# Patient Record
Sex: MALE | Race: WHITE | NOT HISPANIC OR LATINO | Employment: OTHER | URBAN - METROPOLITAN AREA
[De-identification: names, ages, dates, MRNs, and addresses within clinical notes are randomized per-mention and may not be internally consistent; named-entity substitution may affect disease eponyms.]

---

## 2017-07-31 ENCOUNTER — ALLSCRIPTS OFFICE VISIT (OUTPATIENT)
Dept: OTHER | Facility: OTHER | Age: 67
End: 2017-07-31

## 2017-07-31 LAB
BILIRUB UR QL STRIP: NORMAL
CLARITY UR: NORMAL
COLOR UR: YELLOW
GLUCOSE (HISTORICAL): NORMAL
HGB UR QL STRIP.AUTO: NORMAL
KETONES UR STRIP-MCNC: NORMAL MG/DL
LEUKOCYTE ESTERASE UR QL STRIP: NORMAL
NITRITE UR QL STRIP: NORMAL
PH UR STRIP.AUTO: 5.5 [PH]
PROT UR STRIP-MCNC: NORMAL MG/DL
SP GR UR STRIP.AUTO: 1.02
UROBILINOGEN UR QL STRIP.AUTO: 0.2

## 2017-12-22 DIAGNOSIS — N40.1 ENLARGED PROSTATE WITH LOWER URINARY TRACT SYMPTOMS (LUTS): ICD-10-CM

## 2018-01-14 VITALS
HEIGHT: 69 IN | SYSTOLIC BLOOD PRESSURE: 110 MMHG | DIASTOLIC BLOOD PRESSURE: 88 MMHG | WEIGHT: 200 LBS | BODY MASS INDEX: 29.62 KG/M2

## 2018-01-23 ENCOUNTER — ALLSCRIPTS OFFICE VISIT (OUTPATIENT)
Dept: OTHER | Facility: OTHER | Age: 68
End: 2018-01-23

## 2018-01-23 LAB
BILIRUB UR QL STRIP: NORMAL
CLARITY UR: NORMAL
COLOR UR: YELLOW
GLUCOSE (HISTORICAL): NORMAL
HGB UR QL STRIP.AUTO: NORMAL
KETONES UR STRIP-MCNC: NORMAL MG/DL
LEUKOCYTE ESTERASE UR QL STRIP: NORMAL
NITRITE UR QL STRIP: NORMAL
PH UR STRIP.AUTO: 6.5 [PH]
PROT UR STRIP-MCNC: NORMAL MG/DL
SP GR UR STRIP.AUTO: 1.02
UROBILINOGEN UR QL STRIP.AUTO: 0.2

## 2019-01-21 RX ORDER — LISINOPRIL 10 MG/1
10 TABLET ORAL DAILY
Refills: 1 | COMMUNITY
Start: 2018-12-16 | End: 2020-02-24 | Stop reason: ALTCHOICE

## 2019-01-21 RX ORDER — MULTIVITAMIN
TABLET ORAL
COMMUNITY

## 2019-01-21 RX ORDER — LATANOPROST 50 UG/ML
SOLUTION/ DROPS OPHTHALMIC
Refills: 5 | COMMUNITY
Start: 2019-01-07

## 2019-01-21 RX ORDER — METOPROLOL SUCCINATE 50 MG/1
TABLET, EXTENDED RELEASE ORAL
COMMUNITY
End: 2020-02-24 | Stop reason: SDUPTHER

## 2019-01-21 RX ORDER — TAMSULOSIN HYDROCHLORIDE 0.4 MG/1
0.4 CAPSULE ORAL DAILY
Refills: 3 | COMMUNITY
Start: 2018-11-29 | End: 2019-01-24 | Stop reason: SDUPTHER

## 2019-01-24 ENCOUNTER — OFFICE VISIT (OUTPATIENT)
Dept: UROLOGY | Facility: MEDICAL CENTER | Age: 69
End: 2019-01-24
Payer: COMMERCIAL

## 2019-01-24 VITALS
DIASTOLIC BLOOD PRESSURE: 90 MMHG | SYSTOLIC BLOOD PRESSURE: 138 MMHG | WEIGHT: 213 LBS | BODY MASS INDEX: 31.55 KG/M2 | HEART RATE: 90 BPM | HEIGHT: 69 IN

## 2019-01-24 DIAGNOSIS — Z12.5 ENCOUNTER FOR PROSTATE CANCER SCREENING: ICD-10-CM

## 2019-01-24 DIAGNOSIS — N13.8 BPH WITH OBSTRUCTION/LOWER URINARY TRACT SYMPTOMS: ICD-10-CM

## 2019-01-24 DIAGNOSIS — R31.29 MICROSCOPIC HEMATURIA: Primary | ICD-10-CM

## 2019-01-24 DIAGNOSIS — N40.1 BPH WITH OBSTRUCTION/LOWER URINARY TRACT SYMPTOMS: ICD-10-CM

## 2019-01-24 LAB
SL AMB  POCT GLUCOSE, UA: ABNORMAL
SL AMB LEUKOCYTE ESTERASE,UA: ABNORMAL
SL AMB POCT BILIRUBIN,UA: ABNORMAL
SL AMB POCT BLOOD,UA: ABNORMAL
SL AMB POCT CLARITY,UA: CLEAR
SL AMB POCT COLOR,UA: YELLOW
SL AMB POCT KETONES,UA: ABNORMAL
SL AMB POCT NITRITE,UA: ABNORMAL
SL AMB POCT PH,UA: 6
SL AMB POCT SPECIFIC GRAVITY,UA: 1.02
SL AMB POCT URINE PROTEIN: ABNORMAL
SL AMB POCT UROBILINOGEN: 0.2

## 2019-01-24 PROCEDURE — 81003 URINALYSIS AUTO W/O SCOPE: CPT | Performed by: UROLOGY

## 2019-01-24 PROCEDURE — 99214 OFFICE O/P EST MOD 30 MIN: CPT | Performed by: UROLOGY

## 2019-01-24 RX ORDER — SODIUM, POTASSIUM,MAG SULFATES 17.5-3.13G
SOLUTION, RECONSTITUTED, ORAL ORAL
Refills: 0 | COMMUNITY
Start: 2019-01-17 | End: 2020-02-24 | Stop reason: ALTCHOICE

## 2019-01-24 RX ORDER — TAMSULOSIN HYDROCHLORIDE 0.4 MG/1
0.4 CAPSULE ORAL 2 TIMES DAILY
Qty: 180 CAPSULE | Refills: 3 | Status: SHIPPED | OUTPATIENT
Start: 2019-01-24 | End: 2020-03-26

## 2019-01-24 RX ORDER — PREDNISONE 10 MG/1
TABLET ORAL
Refills: 0 | COMMUNITY
Start: 2019-01-14 | End: 2020-02-24 | Stop reason: ALTCHOICE

## 2019-01-24 RX ORDER — BILBERRY FRUIT 1000 MG
CAPSULE ORAL
COMMUNITY

## 2019-01-24 RX ORDER — BENZONATATE 100 MG/1
CAPSULE ORAL
Refills: 0 | COMMUNITY
Start: 2019-01-14 | End: 2020-02-24 | Stop reason: ALTCHOICE

## 2019-01-24 NOTE — LETTER
2019     Rito Mcneil, [address] [facility] [address] [place] [id number]    Patient: Ludivina Bunch   YOB: 1950   Date of Visit: 2019       Dear Dr Marybel Cox: Thank you for referring Josemanuel Gaspar to me for evaluation  Below are my notes for this consultation  If you have questions, please do not hesitate to call me  I look forward to following your patient along with you  Sincerely,        Beti Ortiz MD        CC: No Recipients  Beti Ortiz MD  2019 10:54 AM  Sign at close encounter  [address] [facility]  [address]  [employer], [address] [place] [phone]  www  [facility]  org      NAME: Ludivina Bunch  AGE: 71 y o  SEX: male  : 1950   MRN: [id number]    DATE: 2019  TIME: 10:37 AM    Assessment and Plan:    BPH with obstruction with increased urgency and some urge incontinence when it is very cold and he is very active-he would like to increase his medication and we will place him on Flomax 0 4 mg p  O  B i d   If this is not effective we will add Proscar to his regimen  Follow-up 1 year with PSA  Chief Complaint   No chief complaint on file  History of Present Illness   Yearly follow-up for BPH with obstruction, chronic microscopic hematuria:  Remains on saw palmetto and Flomax 1 daily  Cleopatra Evans PSA is 2 36 as of 2019, and it was 2 08 2018, 1 82 2016 1 53 2015  Today's urinalysis shows trace blood which he has had for years  He had cystoscopy for this about 2 years ago with Dr London Mcfarlane   On a day-to-day basis, he is doing okay except when he goes snowmobiling he has urgency and occasionally has urge incontinence if he cannot exposed himself quickly enough  He is concerned because he has a back packing trip coming up to [place] and he is afraid that he may wet himself if he is not able to retrieve his penis quickly enough    He has nocturia 2 times a night     The following portions of the patient's history were reviewed and updated as appropriate: allergies, current medications, past family history, past medical history, past social history, past surgical history and problem list     Review of Systems   Review of Systems   Genitourinary: Positive for urgency  Negative for difficulty urinating, discharge, dysuria, enuresis, frequency, genital sores, hematuria, penile pain, penile swelling, scrotal swelling and testicular pain  Active Problem List   There is no problem list on file for this patient  Objective   There were no vitals taken for this visit  Physical Exam   Constitutional: He is oriented to person, place, and time  He appears well-developed and well-nourished  HENT:   Head: Normocephalic and atraumatic  Eyes: EOM are normal    Neck: Normal range of motion  Pulmonary/Chest: Effort normal    Abdominal: Soft  He exhibits no distension and no mass  There is no tenderness  There is no rebound and no guarding  No inguinal hernia   Genitourinary: Rectum normal, prostate normal and penis normal    Genitourinary Comments: Rectal exam reveals normal tone, no masses and his prostate is about 40-50 g, smooth symmetric and benign  No nodules  Musculoskeletal: Normal range of motion  Neurological: He is alert and oriented to person, place, and time  Skin: Skin is warm and dry  Psychiatric: He has a normal mood and affect   His behavior is normal  Judgment and thought content normal            Current Medications     Current Outpatient Prescriptions:     benzonatate (TESSALON PERLES) 100 mg capsule, TAKE 1 CAPSULE BY MOUTH THREE TIMES A DAY AS NEEDED FOR COUGH, Disp: , Rfl: 0    latanoprost (XALATAN) 0 005 % ophthalmic solution, USE 1 DROP INTO BOTH EYES AT BEDTIME, Disp: , Rfl: 5    lisinopril (ZESTRIL) 10 mg tablet, Take 10 mg by mouth daily, Disp: , Rfl: 1    metoprolol succinate (TOPROL-XL) 50 mg 24 hr tablet, Take by mouth, Disp: , Rfl:     Misc Natural Products (SAW PALMETTO) CAPS, Take by mouth, Disp: , Rfl:     Multiple Vitamin (MULTI-VITAMIN DAILY) TABS, Take by mouth, Disp: , Rfl:     predniSONE 10 mg tablet, TAKE 1 TAB 3 TIMES DAILY FOR 5DAYS, 1 TAB TWICE DAILY FOR 5DAYS THEN 1TAB FOR 5DAYS, Disp: , Rfl: 0    Saw [place] 1000 MG CAPS, Take by mouth, Disp: , Rfl:     SUPREP BOWEL PREP KIT 17 5-3 13-1 6 GM/177ML SOLN, TAKE 177 MILLILITER AS DIRECTED, Disp: , Rfl: 0    tamsulosin (FLOMAX) 0 4 mg, Take 0 4 mg by mouth daily, Disp: , Rfl: 3        Nathalia Marsh MD

## 2019-01-24 NOTE — PROGRESS NOTES
[address] [facility]  [facility]  [address] [facility] [place]  [place], [address] [facility]  [phone]  www  [facility]  org      NAME: Getachew Gaona  AGE: 71 y o  SEX: male  : 1950   MRN: [id number]    DATE: 2019  TIME: 10:37 AM    Assessment and Plan:    BPH with obstruction with increased urgency and some urge incontinence when it is very cold and he is very active-he would like to increase his medication and we will place him on Flomax 0 4 mg p  O  B i d   If this is not effective we will add Proscar to his regimen  Follow-up 1 year with PSA  Chief Complaint   No chief complaint on file  History of Present Illness   Yearly follow-up for BPH with obstruction, chronic microscopic hematuria:  Remains on saw palmetto and Flomax 1 daily  Mariaa Lay PSA is 2 36 as of 2019, and it was 2 08 2018, 1 82 2016 1 53 2015  Today's urinalysis shows trace blood which he has had for years  He had cystoscopy for this about 2 years ago with Dr Meghan Abebe   On a day-to-day basis, he is doing okay except when he goes snowmobiling he has urgency and occasionally has urge incontinence if he cannot exposed himself quickly enough  He is concerned because he has a back packing trip coming up to [place] and he is afraid that he may wet himself if he is not able to retrieve his penis quickly enough  He has nocturia 2 times a night  The following portions of the patient's history were reviewed and updated as appropriate: allergies, current medications, past family history, past medical history, past social history, past surgical history and problem list     Review of Systems   Review of Systems   Genitourinary: Positive for urgency  Negative for difficulty urinating, discharge, dysuria, enuresis, frequency, genital sores, hematuria, penile pain, penile swelling, scrotal swelling and testicular pain         Active Problem List   There is no problem list on file for this patient  Objective   There were no vitals taken for this visit  Physical Exam   Constitutional: He is oriented to person, place, and time  He appears well-developed and well-nourished  HENT:   Head: Normocephalic and atraumatic  Eyes: EOM are normal    Neck: Normal range of motion  Pulmonary/Chest: Effort normal    Abdominal: Soft  He exhibits no distension and no mass  There is no tenderness  There is no rebound and no guarding  No inguinal hernia   Genitourinary: Rectum normal, prostate normal and penis normal    Genitourinary Comments: Rectal exam reveals normal tone, no masses and his prostate is about 40-50 g, smooth symmetric and benign  No nodules  Musculoskeletal: Normal range of motion  Neurological: He is alert and oriented to person, place, and time  Skin: Skin is warm and dry  Psychiatric: He has a normal mood and affect   His behavior is normal  Judgment and thought content normal            Current Medications     Current Outpatient Prescriptions:     benzonatate (TESSALON PERLES) 100 mg capsule, TAKE 1 CAPSULE BY MOUTH THREE TIMES A DAY AS NEEDED FOR COUGH, Disp: , Rfl: 0    latanoprost (XALATAN) 0 005 % ophthalmic solution, USE 1 DROP INTO BOTH EYES AT BEDTIME, Disp: , Rfl: 5    lisinopril (ZESTRIL) 10 mg tablet, Take 10 mg by mouth daily, Disp: , Rfl: 1    metoprolol succinate (TOPROL-XL) 50 mg 24 hr tablet, Take by mouth, Disp: , Rfl:     Misc Natural Products (SAW PALMETTO) CAPS, Take by mouth, Disp: , Rfl:     Multiple Vitamin (MULTI-VITAMIN DAILY) TABS, Take by mouth, Disp: , Rfl:     predniSONE 10 mg tablet, TAKE 1 TAB 3 TIMES DAILY FOR 5DAYS, 1 TAB TWICE DAILY FOR 5DAYS THEN 1TAB FOR 5DAYS, Disp: , Rfl: 0    Saw [place] 1000 MG CAPS, Take by mouth, Disp: , Rfl:     SUPREP BOWEL PREP KIT 17 5-3 13-1 6 GM/177ML SOLN, TAKE 177 MILLILITER AS DIRECTED, Disp: , Rfl: 0    tamsulosin (FLOMAX) 0 4 mg, Take 0 4 mg by mouth daily, Disp: , Rfl: [address] [place] [address], MD

## 2020-02-24 ENCOUNTER — OFFICE VISIT (OUTPATIENT)
Dept: UROLOGY | Facility: MEDICAL CENTER | Age: 70
End: 2020-02-24
Payer: COMMERCIAL

## 2020-02-24 VITALS
BODY MASS INDEX: 31.84 KG/M2 | DIASTOLIC BLOOD PRESSURE: 80 MMHG | HEART RATE: 98 BPM | SYSTOLIC BLOOD PRESSURE: 118 MMHG | WEIGHT: 215 LBS | HEIGHT: 69 IN

## 2020-02-24 DIAGNOSIS — R97.20 RISING PSA LEVEL: ICD-10-CM

## 2020-02-24 DIAGNOSIS — N40.1 BPH WITH OBSTRUCTION/LOWER URINARY TRACT SYMPTOMS: Primary | ICD-10-CM

## 2020-02-24 DIAGNOSIS — N13.8 BPH WITH OBSTRUCTION/LOWER URINARY TRACT SYMPTOMS: Primary | ICD-10-CM

## 2020-02-24 LAB
SL AMB  POCT GLUCOSE, UA: ABNORMAL
SL AMB LEUKOCYTE ESTERASE,UA: ABNORMAL
SL AMB POCT BILIRUBIN,UA: ABNORMAL
SL AMB POCT BLOOD,UA: ABNORMAL
SL AMB POCT CLARITY,UA: ABNORMAL
SL AMB POCT COLOR,UA: YELLOW
SL AMB POCT KETONES,UA: ABNORMAL
SL AMB POCT NITRITE,UA: ABNORMAL
SL AMB POCT PH,UA: 5.5
SL AMB POCT SPECIFIC GRAVITY,UA: >=1.03
SL AMB POCT URINE PROTEIN: ABNORMAL
SL AMB POCT UROBILINOGEN: 0.2

## 2020-02-24 PROCEDURE — 81003 URINALYSIS AUTO W/O SCOPE: CPT | Performed by: UROLOGY

## 2020-02-24 PROCEDURE — 99214 OFFICE O/P EST MOD 30 MIN: CPT | Performed by: UROLOGY

## 2020-02-24 RX ORDER — METOPROLOL TARTRATE 50 MG/1
TABLET, FILM COATED ORAL
COMMUNITY
Start: 2019-03-07

## 2020-02-24 RX ORDER — AMLODIPINE BESYLATE 2.5 MG/1
2.5 TABLET ORAL DAILY
COMMUNITY
Start: 2019-12-27 | End: 2022-06-20

## 2020-02-24 RX ORDER — ALBUTEROL SULFATE 90 UG/1
2 AEROSOL, METERED RESPIRATORY (INHALATION) EVERY 6 HOURS PRN
COMMUNITY
Start: 2020-02-13 | End: 2021-02-12

## 2020-02-24 NOTE — PROGRESS NOTES
[address] [facility]  [facility]  [address] [facility]  [place], [address] [facility]  [phone]  www  [facility]  org      NAME: Shawn Abdi  AGE: 79 y o  SEX: male  : 1950   MRN: [id number]    DATE: 2020  TIME: 10:48 AM    Assessment and Plan:    Rising PSA:  Normal rectal exam, and I am concerned about the rise in the PSA we discussed doing a biopsy now versus doing in 6 months  I think the best check it again in 6 months to see if it drops and if it does not we will set him up for transperineal prostate biopsy  BPH with obstruction with lower urinary tract symptoms:  Overall he is doing very well with this and continue with the tamsulosin  Also the saw palmetto  Chief Complaint   No chief complaint on file  History of Present Illness   Yearly follow-up for BPH with obstruction, chronic microscopic hematuria:  Remains on saw palmetto and Flomax 1 daily  Irl Pirosia PSA is 3 5 to as of 2020 2 36 as of 2019, and it was 2 08 2018, 1 82 2016 1 53 2015  Today's urinalysis shows trace blood which he has had for years  He had cystoscopy for this about 2 years ago with Dr Tamika Saleh   Voiding well on the Flomax b i d  And saw palmetto  He voids frequently but he drinks a fair amount water  Nocturia 2 times a night  The following portions of the patient's history were reviewed and updated as appropriate: allergies, current medications, past family history, past medical history, past social history, past surgical history and problem list   Past Medical History:   Diagnosis Date    Arthritis     BPH with obstruction/lower urinary tract symptoms     Enlarged prostate     Erectile dysfunction     Feeling of incomplete bladder emptying     Microscopic hematuria     Poor urinary stream     Urgency of urination      No past surgical history on file    shoulder  Review of Systems   Review of Systems   Genitourinary: Positive for frequency  Negative for difficulty urinating and hematuria  Active Problem List   There is no problem list on file for this patient  Objective   /80   Pulse 98   Ht 5' 9" (1 753 m)   Wt 97 5 kg (215 lb)   BMI 31 75 kg/m²     Physical Exam   Constitutional: He is oriented to person, place, and time  He appears well-developed and well-nourished  HENT:   Head: Normocephalic and atraumatic  Eyes: EOM are normal    Neck: Normal range of motion  Pulmonary/Chest: Effort normal    Genitourinary: Rectum normal and prostate normal    Genitourinary Comments: Rectal exam reveals normal tone, no masses and his prostate is about 40-50 g, smooth symmetric and benign  No nodules  Musculoskeletal: Normal range of motion  Neurological: He is alert and oriented to person, place, and time  Skin: Skin is warm and dry  Psychiatric: He has a normal mood and affect   His behavior is normal  Judgment and thought content normal            Current Medications     Current Outpatient Medications:     albuterol (PROVENTIL HFA,VENTOLIN HFA) 90 mcg/act inhaler, Inhale 2 puffs every 6 (six) hours as needed, Disp: , Rfl:     amLODIPine (NORVASC) 2 5 mg tablet, Take 2 5 mg by mouth daily, Disp: , Rfl:     latanoprost (XALATAN) 0 005 % ophthalmic solution, USE 1 DROP INTO BOTH EYES AT BEDTIME, Disp: , Rfl: 5    metoprolol tartrate (LOPRESSOR) 50 mg tablet, TAKE 1/2 TABLET (25MG) BY MOUTH TWICE DAILY, Disp: , Rfl:     Multiple Vitamin (MULTI-VITAMIN DAILY) TABS, Take by mouth, Disp: , Rfl:     Saw [place] 1000 MG CAPS, Take by mouth, Disp: , Rfl:     tamsulosin (FLOMAX) 0 4 mg, Take 1 capsule (0 4 mg total) by mouth 2 (two) times a day, Disp: 180 capsule, Rfl: 3        Miguel Angel Mcintosh MD

## 2020-03-26 DIAGNOSIS — N40.1 BPH WITH OBSTRUCTION/LOWER URINARY TRACT SYMPTOMS: ICD-10-CM

## 2020-03-26 DIAGNOSIS — N13.8 BPH WITH OBSTRUCTION/LOWER URINARY TRACT SYMPTOMS: ICD-10-CM

## 2020-03-26 RX ORDER — TAMSULOSIN HYDROCHLORIDE 0.4 MG/1
0.4 CAPSULE ORAL 2 TIMES DAILY
Qty: 180 CAPSULE | Refills: 3 | Status: SHIPPED | OUTPATIENT
Start: 2020-03-26 | End: 2021-04-27

## 2020-08-24 ENCOUNTER — OFFICE VISIT (OUTPATIENT)
Dept: UROLOGY | Facility: MEDICAL CENTER | Age: 70
End: 2020-08-24
Payer: COMMERCIAL

## 2020-08-24 VITALS
TEMPERATURE: 97.5 F | SYSTOLIC BLOOD PRESSURE: 114 MMHG | BODY MASS INDEX: 30.96 KG/M2 | HEIGHT: 69 IN | DIASTOLIC BLOOD PRESSURE: 70 MMHG | WEIGHT: 209 LBS

## 2020-08-24 DIAGNOSIS — Z01.810 PREOP CARDIOVASCULAR EXAM: ICD-10-CM

## 2020-08-24 DIAGNOSIS — N40.1 BPH WITH OBSTRUCTION/LOWER URINARY TRACT SYMPTOMS: Primary | ICD-10-CM

## 2020-08-24 DIAGNOSIS — R97.20 RISING PSA LEVEL: ICD-10-CM

## 2020-08-24 DIAGNOSIS — N13.8 BPH WITH OBSTRUCTION/LOWER URINARY TRACT SYMPTOMS: Primary | ICD-10-CM

## 2020-08-24 LAB
SL AMB  POCT GLUCOSE, UA: NORMAL
SL AMB LEUKOCYTE ESTERASE,UA: NORMAL
SL AMB POCT BILIRUBIN,UA: NORMAL
SL AMB POCT BLOOD,UA: NORMAL
SL AMB POCT CLARITY,UA: CLEAR
SL AMB POCT COLOR,UA: YELLOW
SL AMB POCT KETONES,UA: NORMAL
SL AMB POCT NITRITE,UA: NORMAL
SL AMB POCT PH,UA: 6.5
SL AMB POCT SPECIFIC GRAVITY,UA: 1.02
SL AMB POCT URINE PROTEIN: NORMAL
SL AMB POCT UROBILINOGEN: 0.2

## 2020-08-24 PROCEDURE — 81003 URINALYSIS AUTO W/O SCOPE: CPT | Performed by: UROLOGY

## 2020-08-24 PROCEDURE — 99214 OFFICE O/P EST MOD 30 MIN: CPT | Performed by: UROLOGY

## 2020-08-24 RX ORDER — SILDENAFIL 100 MG/1
100 TABLET, FILM COATED ORAL AS NEEDED
COMMUNITY
Start: 2020-07-07 | End: 2022-06-20 | Stop reason: SDUPTHER

## 2020-08-24 NOTE — H&P
[address] [facility]  [facility]  [address] [facility]  [place], [address] [facility]  [phone]  www  [facility]  org      NAME: Capri Salas  AGE: 79 y o  SEX: male  : 1950   MRN: [id number]    DATE: 2020  TIME: 10:32 AM    Assessment and Plan:    Rising PSA,   And this is concerning for malignancy  We discussed performing prostate biopsy, and I recommend this  This will be a transperineal prostate biopsy in the technique as well as the risks of bleeding infection and urinary retention were explained he gives informed consent  Chief Complaint   No chief complaint on file  History of Present Illness   Follow-up rising PSA:  Last office visit we discussed performing a biopsy then verses doing in 6 months  PSA is now up to 3 98, PSA was up to 3 5 as of 2020, and this was showing a steady rise from 2 36 the year before, and 2 08 and 1 82 and 1 53  He has chronic microscopic hematuria and he underwent cystoscopy 3 years ago for this  He is on Flomax b i d         The following portions of the patient's history were reviewed and updated as appropriate: allergies, current medications, past family history, past medical history, past social history, past surgical history and problem list   Past Medical History:   Diagnosis Date    Arthritis     BPH with obstruction/lower urinary tract symptoms     Enlarged prostate     Erectile dysfunction     Feeling of incomplete bladder emptying     Microscopic hematuria     Poor urinary stream     Urgency of urination      History reviewed  No pertinent surgical history  shoulder  Review of Systems   Review of Systems   Constitutional: Negative for fever  Respiratory: Negative  Cardiovascular: Negative  Genitourinary: Negative  Active Problem List   There is no problem list on file for this patient        Objective   /70   Temp 97 5 °F (36 4 °C)   Ht 5' 9" (1 753 m)   Wt 94 8 kg (209 lb)   BMI 30 86 kg/m²     Physical Exam  Constitutional:       Appearance: Normal appearance  He is normal weight  HENT:      Head: Normocephalic and atraumatic  Eyes:      Extraocular Movements: Extraocular movements intact  Neck:      Musculoskeletal: Normal range of motion  Cardiovascular:      Rate and Rhythm: Normal rate and regular rhythm  Heart sounds: Normal heart sounds  Pulmonary:      Effort: Pulmonary effort is normal  No respiratory distress  Breath sounds: Normal breath sounds  No stridor  No wheezing or rhonchi  Abdominal:      General: There is no distension  Palpations: Abdomen is soft  Tenderness: There is no abdominal tenderness  Musculoskeletal: Normal range of motion  Neurological:      General: No focal deficit present  Mental Status: He is alert and oriented to person, place, and time  Psychiatric:         Mood and Affect: Mood normal          Behavior: Behavior normal          Thought Content:  Thought content normal          Judgment: Judgment normal              Current Medications     Current Outpatient Medications:     albuterol (PROVENTIL HFA,VENTOLIN HFA) 90 mcg/act inhaler, Inhale 2 puffs every 6 (six) hours as needed, Disp: , Rfl:     latanoprost (XALATAN) 0 005 % ophthalmic solution, USE 1 DROP INTO BOTH EYES AT BEDTIME, Disp: , Rfl: 5    metoprolol tartrate (LOPRESSOR) 50 mg tablet, TAKE 1/2 TABLET (25MG) BY MOUTH TWICE DAILY, Disp: , Rfl:     Multiple Vitamin (MULTI-VITAMIN DAILY) TABS, Take by mouth, Disp: , Rfl:     Saw [place] 1000 MG CAPS, Take by mouth, Disp: , Rfl:     sildenafil (VIAGRA) 100 mg tablet, Take 100 mg by mouth as needed , Disp: , Rfl:     tamsulosin (FLOMAX) 0 4 mg, TAKE 1 CAPSULE (0 4 MG TOTAL) BY MOUTH 2 (TWO) TIMES A DAY, Disp: 180 capsule, Rfl: 3    amLODIPine (NORVASC) 2 5 mg tablet, Take 2 5 mg by mouth daily, Disp: , Rfl:         Brando Yost MD

## 2020-08-24 NOTE — PROGRESS NOTES
[address] [facility]  [facility]  [address] [facility], [address] [place] [address]  [phone]  www  [facility]  org      NAME: Jamal Marcano  AGE: 79 y o  SEX: male  : 1950   MRN: [id number]    DATE: 2020  TIME: 10:32 AM    Assessment and Plan:    Rising PSA,   And this is concerning for malignancy  We discussed performing prostate biopsy, and I recommend this  This will be a transperineal prostate biopsy in the technique as well as the risks of bleeding infection and urinary retention were explained he gives informed consent  Chief Complaint   No chief complaint on file  History of Present Illness   Follow-up rising PSA:  Last office visit we discussed performing a biopsy then verses doing in 6 months  PSA is now up to 3 98, PSA was up to 3 5 as of 2020, and this was showing a steady rise from 2 36 the year before, and 2 08 and 1 82 and 1 53  He has chronic microscopic hematuria and he underwent cystoscopy 3 years ago for this  He is on Flomax b i d         The following portions of the patient's history were reviewed and updated as appropriate: allergies, current medications, past family history, past medical history, past social history, past surgical history and problem list   Past Medical History:   Diagnosis Date    Arthritis     BPH with obstruction/lower urinary tract symptoms     Enlarged prostate     Erectile dysfunction     Feeling of incomplete bladder emptying     Microscopic hematuria     Poor urinary stream     Urgency of urination      History reviewed  No pertinent surgical history  shoulder  Review of Systems   Review of Systems   Constitutional: Negative for fever  Respiratory: Negative  Cardiovascular: Negative  Genitourinary: Negative  Active Problem List   There is no problem list on file for this patient        Objective   /70   Temp 97 5 °F (36 4 °C)   Ht 5' 9" (1 753 m)   Wt 94 8 kg (209 lb)   BMI 30 86 kg/m²     Physical Exam  Constitutional:       Appearance: Normal appearance  He is normal weight  HENT:      Head: Normocephalic and atraumatic  Eyes:      Extraocular Movements: Extraocular movements intact  Neck:      Musculoskeletal: Normal range of motion  Cardiovascular:      Rate and Rhythm: Normal rate and regular rhythm  Heart sounds: Normal heart sounds  Pulmonary:      Effort: Pulmonary effort is normal  No respiratory distress  Breath sounds: Normal breath sounds  No stridor  No wheezing or rhonchi  Abdominal:      General: There is no distension  Palpations: Abdomen is soft  Tenderness: There is no abdominal tenderness  Musculoskeletal: Normal range of motion  Neurological:      General: No focal deficit present  Mental Status: He is alert and oriented to person, place, and time  Psychiatric:         Mood and Affect: Mood normal          Behavior: Behavior normal          Thought Content:  Thought content normal          Judgment: Judgment normal              Current Medications     Current Outpatient Medications:     albuterol (PROVENTIL HFA,VENTOLIN HFA) 90 mcg/act inhaler, Inhale 2 puffs every 6 (six) hours as needed, Disp: , Rfl:     latanoprost (XALATAN) 0 005 % ophthalmic solution, USE 1 DROP INTO BOTH EYES AT BEDTIME, Disp: , Rfl: 5    metoprolol tartrate (LOPRESSOR) 50 mg tablet, TAKE 1/2 TABLET (25MG) BY MOUTH TWICE DAILY, Disp: , Rfl:     Multiple Vitamin (MULTI-VITAMIN DAILY) TABS, Take by mouth, Disp: , Rfl:     Saw [place] 1000 MG CAPS, Take by mouth, Disp: , Rfl:     sildenafil (VIAGRA) 100 mg tablet, Take 100 mg by mouth as needed , Disp: , Rfl:     tamsulosin (FLOMAX) 0 4 mg, TAKE 1 CAPSULE (0 4 MG TOTAL) BY MOUTH 2 (TWO) TIMES A DAY, Disp: 180 capsule, Rfl: 3    amLODIPine (NORVASC) 2 5 mg tablet, Take 2 5 mg by mouth daily, Disp: , Rfl:         Angelic Ashley MD

## 2020-08-26 ENCOUNTER — TELEPHONE (OUTPATIENT)
Dept: UROLOGY | Facility: MEDICAL CENTER | Age: 70
End: 2020-08-26

## 2020-08-26 NOTE — TELEPHONE ENCOUNTER
I left a voice mail message for the patient to schedule his procedure for 10/13/2020 at the [address] [place] [address] Way with Dr Salgado [place]     -instructions Emailed  -CBC, CMP 7-10 days prior  -[facility] -

## 2020-10-05 ENCOUNTER — APPOINTMENT (OUTPATIENT)
Dept: LAB | Facility: CLINIC | Age: 70
End: 2020-10-05
Payer: COMMERCIAL

## 2020-10-05 DIAGNOSIS — R97.20 RISING PSA LEVEL: ICD-10-CM

## 2020-10-05 LAB
ALBUMIN SERPL BCP-MCNC: 3.8 G/DL (ref 3.5–5)
ALP SERPL-CCNC: 94 U/L (ref 46–116)
ALT SERPL W P-5'-P-CCNC: 37 U/L (ref 12–78)
ANION GAP SERPL CALCULATED.3IONS-SCNC: 4 MMOL/L (ref 4–13)
AST SERPL W P-5'-P-CCNC: 24 U/L (ref 5–45)
BASOPHILS # BLD AUTO: 0.03 THOUSANDS/ΜL (ref 0–0.1)
BASOPHILS NFR BLD AUTO: 1 % (ref 0–1)
BILIRUB SERPL-MCNC: 0.6 MG/DL (ref 0.2–1)
BUN SERPL-MCNC: 19 MG/DL (ref 5–25)
CALCIUM SERPL-MCNC: 9.4 MG/DL (ref 8.3–10.1)
CHLORIDE SERPL-SCNC: 110 MMOL/L (ref 100–108)
CO2 SERPL-SCNC: 29 MMOL/L (ref 21–32)
CREAT SERPL-MCNC: 0.81 MG/DL (ref 0.6–1.3)
EOSINOPHIL # BLD AUTO: 0.12 THOUSAND/ΜL (ref 0–0.61)
EOSINOPHIL NFR BLD AUTO: 2 % (ref 0–6)
ERYTHROCYTE [DISTWIDTH] IN BLOOD BY AUTOMATED COUNT: 12.9 % (ref 11.6–15.1)
GFR SERPL CREATININE-BSD FRML MDRD: 90 ML/MIN/1.73SQ M
GLUCOSE P FAST SERPL-MCNC: 109 MG/DL (ref 65–99)
HCT VFR BLD AUTO: 47.6 % (ref 36.5–49.3)
HGB BLD-MCNC: 16.2 G/DL (ref 12–17)
IMM GRANULOCYTES # BLD AUTO: 0.02 THOUSAND/UL (ref 0–0.2)
IMM GRANULOCYTES NFR BLD AUTO: 0 % (ref 0–2)
LYMPHOCYTES # BLD AUTO: 1 THOUSANDS/ΜL (ref 0.6–4.47)
LYMPHOCYTES NFR BLD AUTO: 17 % (ref 14–44)
MCH RBC QN AUTO: 32.4 PG (ref 26.8–34.3)
MCHC RBC AUTO-ENTMCNC: 34 G/DL (ref 31.4–37.4)
MCV RBC AUTO: 95 FL (ref 82–98)
MONOCYTES # BLD AUTO: 0.56 THOUSAND/ΜL (ref 0.17–1.22)
MONOCYTES NFR BLD AUTO: 10 % (ref 4–12)
NEUTROPHILS # BLD AUTO: 4.04 THOUSANDS/ΜL (ref 1.85–7.62)
NEUTS SEG NFR BLD AUTO: 70 % (ref 43–75)
NRBC BLD AUTO-RTO: 0 /100 WBCS
PLATELET # BLD AUTO: 167 THOUSANDS/UL (ref 149–390)
PMV BLD AUTO: 11.7 FL (ref 8.9–12.7)
POTASSIUM SERPL-SCNC: 4.2 MMOL/L (ref 3.5–5.3)
PROT SERPL-MCNC: 7.6 G/DL (ref 6.4–8.2)
PSA SERPL-MCNC: 2.9 NG/ML (ref 0–4)
RBC # BLD AUTO: 5 MILLION/UL (ref 3.88–5.62)
SODIUM SERPL-SCNC: 143 MMOL/L (ref 136–145)
WBC # BLD AUTO: 5.77 THOUSAND/UL (ref 4.31–10.16)

## 2020-10-05 PROCEDURE — 36415 COLL VENOUS BLD VENIPUNCTURE: CPT

## 2020-10-05 PROCEDURE — 80053 COMPREHEN METABOLIC PANEL: CPT

## 2020-10-05 PROCEDURE — 84153 ASSAY OF PSA TOTAL: CPT

## 2020-10-05 PROCEDURE — 85025 COMPLETE CBC W/AUTO DIFF WBC: CPT

## 2020-10-12 ENCOUNTER — ANESTHESIA EVENT (OUTPATIENT)
Dept: GASTROENTEROLOGY | Facility: HOSPITAL | Age: 70
End: 2020-10-12
Payer: COMMERCIAL

## 2020-10-12 RX ORDER — SODIUM CHLORIDE 9 MG/ML
125 INJECTION, SOLUTION INTRAVENOUS CONTINUOUS
Status: CANCELLED | OUTPATIENT
Start: 2020-10-12

## 2020-10-13 ENCOUNTER — ANESTHESIA (OUTPATIENT)
Dept: GASTROENTEROLOGY | Facility: HOSPITAL | Age: 70
End: 2020-10-13
Payer: COMMERCIAL

## 2020-10-13 ENCOUNTER — TELEPHONE (OUTPATIENT)
Dept: UROLOGY | Facility: CLINIC | Age: 70
End: 2020-10-13

## 2020-10-13 ENCOUNTER — HOSPITAL ENCOUNTER (OUTPATIENT)
Facility: HOSPITAL | Age: 70
Setting detail: OUTPATIENT SURGERY
Discharge: HOME | End: 2020-10-13
Attending: UROLOGY | Admitting: UROLOGY
Payer: COMMERCIAL

## 2020-10-13 VITALS
DIASTOLIC BLOOD PRESSURE: 55 MMHG | HEIGHT: 69 IN | WEIGHT: 205 LBS | TEMPERATURE: 98 F | RESPIRATION RATE: 18 BRPM | HEART RATE: 69 BPM | SYSTOLIC BLOOD PRESSURE: 95 MMHG | BODY MASS INDEX: 30.36 KG/M2 | OXYGEN SATURATION: 94 %

## 2020-10-13 VITALS — HEART RATE: 74 BPM

## 2020-10-13 DIAGNOSIS — R97.20 RISING PSA LEVEL: ICD-10-CM

## 2020-10-13 PROBLEM — E66.9 OBESITY: Status: ACTIVE | Noted: 2020-10-13

## 2020-10-13 PROBLEM — N42.9 PROSTATIC DISORDER: Chronic | Status: ACTIVE | Noted: 2020-10-13

## 2020-10-13 PROBLEM — J45.909 ASTHMA: Chronic | Status: ACTIVE | Noted: 2020-10-13

## 2020-10-13 PROBLEM — I10 HTN (HYPERTENSION): Chronic | Status: ACTIVE | Noted: 2020-10-13

## 2020-10-13 PROCEDURE — G0416 PROSTATE BIOPSY, ANY MTHD: HCPCS | Performed by: PATHOLOGY

## 2020-10-13 PROCEDURE — 88344 IMHCHEM/IMCYTCHM EA MLT ANTB: CPT | Performed by: PATHOLOGY

## 2020-10-13 PROCEDURE — 55700 PR BIOPSY OF PROSTATE,NEEDLE/PUNCH: CPT | Performed by: UROLOGY

## 2020-10-13 PROCEDURE — NC001 PR NO CHARGE: Performed by: UROLOGY

## 2020-10-13 RX ORDER — FENTANYL CITRATE 50 UG/ML
INJECTION, SOLUTION INTRAMUSCULAR; INTRAVENOUS AS NEEDED
Status: DISCONTINUED | OUTPATIENT
Start: 2020-10-13 | End: 2020-10-13

## 2020-10-13 RX ORDER — TRAMADOL HYDROCHLORIDE 50 MG/1
50 TABLET ORAL EVERY 6 HOURS PRN
Status: DISCONTINUED | OUTPATIENT
Start: 2020-10-13 | End: 2020-10-13 | Stop reason: HOSPADM

## 2020-10-13 RX ORDER — TRAMADOL HYDROCHLORIDE 50 MG/1
50 TABLET ORAL EVERY 6 HOURS PRN
Qty: 5 TABLET | Refills: 0 | Status: SHIPPED | OUTPATIENT
Start: 2020-10-13

## 2020-10-13 RX ORDER — METOPROLOL TARTRATE 50 MG/1
50 TABLET, FILM COATED ORAL ONCE
Status: COMPLETED | OUTPATIENT
Start: 2020-10-13 | End: 2020-10-13

## 2020-10-13 RX ORDER — LIDOCAINE HYDROCHLORIDE 10 MG/ML
INJECTION, SOLUTION EPIDURAL; INFILTRATION; INTRACAUDAL; PERINEURAL AS NEEDED
Status: DISCONTINUED | OUTPATIENT
Start: 2020-10-13 | End: 2020-10-13 | Stop reason: HOSPADM

## 2020-10-13 RX ORDER — SODIUM CHLORIDE 9 MG/ML
INJECTION, SOLUTION INTRAVENOUS CONTINUOUS PRN
Status: DISCONTINUED | OUTPATIENT
Start: 2020-10-13 | End: 2020-10-13

## 2020-10-13 RX ORDER — LIDOCAINE HYDROCHLORIDE 10 MG/ML
INJECTION, SOLUTION EPIDURAL; INFILTRATION; INTRACAUDAL; PERINEURAL AS NEEDED
Status: DISCONTINUED | OUTPATIENT
Start: 2020-10-13 | End: 2020-10-13

## 2020-10-13 RX ORDER — PROPOFOL 10 MG/ML
INJECTION, EMULSION INTRAVENOUS AS NEEDED
Status: DISCONTINUED | OUTPATIENT
Start: 2020-10-13 | End: 2020-10-13

## 2020-10-13 RX ADMIN — PROPOFOL 50 MG: 10 INJECTION, EMULSION INTRAVENOUS at 09:24

## 2020-10-13 RX ADMIN — SODIUM CHLORIDE: 0.9 INJECTION, SOLUTION INTRAVENOUS at 09:07

## 2020-10-13 RX ADMIN — Medication 1000 MG: at 08:48

## 2020-10-13 RX ADMIN — LIDOCAINE HYDROCHLORIDE 50 MG: 10 INJECTION, SOLUTION EPIDURAL; INFILTRATION; INTRACAUDAL; PERINEURAL at 09:10

## 2020-10-13 RX ADMIN — PROPOFOL 100 MG: 10 INJECTION, EMULSION INTRAVENOUS at 09:10

## 2020-10-13 RX ADMIN — FENTANYL CITRATE 25 MCG: 50 INJECTION, SOLUTION INTRAMUSCULAR; INTRAVENOUS at 09:10

## 2020-10-13 RX ADMIN — FENTANYL CITRATE 25 MCG: 50 INJECTION, SOLUTION INTRAMUSCULAR; INTRAVENOUS at 09:29

## 2020-10-13 RX ADMIN — PROPOFOL 50 MG: 10 INJECTION, EMULSION INTRAVENOUS at 09:16

## 2020-10-13 RX ADMIN — FENTANYL CITRATE 25 MCG: 50 INJECTION, SOLUTION INTRAMUSCULAR; INTRAVENOUS at 09:31

## 2020-10-13 RX ADMIN — METOPROLOL TARTRATE 50 MG: 50 TABLET, FILM COATED ORAL at 09:03

## 2020-10-13 RX ADMIN — PROPOFOL 50 MG: 10 INJECTION, EMULSION INTRAVENOUS at 09:29

## 2020-10-13 RX ADMIN — FENTANYL CITRATE 25 MCG: 50 INJECTION, SOLUTION INTRAMUSCULAR; INTRAVENOUS at 09:07

## 2020-10-13 RX ADMIN — PROPOFOL 50 MG: 10 INJECTION, EMULSION INTRAVENOUS at 09:20

## 2020-10-20 DIAGNOSIS — R97.20 RISING PSA LEVEL: Primary | ICD-10-CM

## 2021-04-17 ENCOUNTER — APPOINTMENT (OUTPATIENT)
Dept: LAB | Facility: MEDICAL CENTER | Age: 71
End: 2021-04-17
Attending: UROLOGY
Payer: COMMERCIAL

## 2021-04-17 DIAGNOSIS — R97.20 RISING PSA LEVEL: ICD-10-CM

## 2021-04-17 LAB — PSA SERPL-MCNC: 3.8 NG/ML (ref 0–4)

## 2021-04-17 PROCEDURE — 84153 ASSAY OF PSA TOTAL: CPT

## 2021-04-17 PROCEDURE — 36415 COLL VENOUS BLD VENIPUNCTURE: CPT

## 2021-04-19 RX ORDER — ALBUTEROL SULFATE 90 UG/1
AEROSOL, METERED RESPIRATORY (INHALATION)
COMMUNITY
Start: 2021-04-07

## 2021-04-26 ENCOUNTER — OFFICE VISIT (OUTPATIENT)
Dept: UROLOGY | Facility: MEDICAL CENTER | Age: 71
End: 2021-04-26
Payer: COMMERCIAL

## 2021-04-26 VITALS — BODY MASS INDEX: 29.62 KG/M2 | HEIGHT: 69 IN | WEIGHT: 200 LBS

## 2021-04-26 DIAGNOSIS — R97.20 ELEVATED PSA: ICD-10-CM

## 2021-04-26 DIAGNOSIS — N13.8 BPH WITH OBSTRUCTION/LOWER URINARY TRACT SYMPTOMS: Primary | ICD-10-CM

## 2021-04-26 DIAGNOSIS — N40.1 BPH WITH OBSTRUCTION/LOWER URINARY TRACT SYMPTOMS: Primary | ICD-10-CM

## 2021-04-26 LAB
SL AMB  POCT GLUCOSE, UA: NORMAL
SL AMB LEUKOCYTE ESTERASE,UA: NORMAL
SL AMB POCT BILIRUBIN,UA: NORMAL
SL AMB POCT BLOOD,UA: NORMAL
SL AMB POCT CLARITY,UA: CLEAR
SL AMB POCT COLOR,UA: YELLOW
SL AMB POCT KETONES,UA: NORMAL
SL AMB POCT NITRITE,UA: NORMAL
SL AMB POCT PH,UA: 7
SL AMB POCT SPECIFIC GRAVITY,UA: 1.02
SL AMB POCT URINE PROTEIN: NORMAL
SL AMB POCT UROBILINOGEN: 0.2

## 2021-04-26 PROCEDURE — 99214 OFFICE O/P EST MOD 30 MIN: CPT | Performed by: PHYSICIAN ASSISTANT

## 2021-04-26 PROCEDURE — 81003 URINALYSIS AUTO W/O SCOPE: CPT | Performed by: PHYSICIAN ASSISTANT

## 2021-04-26 NOTE — PROGRESS NOTES
4/26/2021      Chief Complaint   Patient presents with    Elevated PSA    Benign Prostatic Hypertrophy       Assessment and Plan    70 y o  male managed by Dr Breanne Ramsey    1  Elevated PSA  · S/p transperineal prostate bx on 10/13/2020 with Dr Celeste Blanc that was benign  · PSA: 3 8 (4/17/21)  · Previous PSAs: 2 9 (10/5/2020), 3 98 (8/19/2020), 3 52 (2/13/2020), 1/14/19)  · KENZIE today revealed smooth, enlarged prostate, without appreciable nodule, induration or asymmetry  · Repeat PSA in 6 months     2  BPH  · Urine today: trace blood  · S/p negative work up in 2017  · Patient defers repeat work up until next year  · PVR: 10 mLs   · AUA score: 25  · Patient on 6 week diet recently that significantly increased his urinary symptoms  Currently off diet  · Increase Flomax to 0 8 mg PO daily at dinner time  · No refills needed at this time  · Patient wishes to discontinue his [place] at this time, as he does not notice any symptom alleviation  · Stop fluid intake in the evening rather than right before bed  · Reviewed Kegel exercises  · Will reassess symptoms in 6 months  History of Present Illness  Edrie Mortimer is a 70 y o  male here for evaluation of elevated PSA and BPH  Patient is s/p transperineal prostate bx on 10/13/2020 with Dr Celeste Blanc that was completely benign  He presents today for repeat PSA and increased urinary symptoms  Patient does note he was recently on a 6 week diet that included appetite supressant supplements and increased daily water intake  During this diet, he noticed his urinary symptoms of nocturia and urinary frequency had increased  He has since discontinued this diet, and has noticed some improvement of his symptoms  He also notes sensations of incomplete bladder emptying, weak and intermittent stream, but denies straining to urinate  He was also drinking fluids up until he went to bed  He would drink around 64 oz of water a day       Review of Systems Constitutional: Negative for chills and fever  HENT: Negative  Eyes: Negative  Respiratory: Negative  Cardiovascular: Negative  Gastrointestinal: Negative for abdominal pain, nausea and vomiting  Endocrine: Negative  Genitourinary: Positive for frequency and urgency (Occasional urge urinary incontinence  )  Negative for difficulty urinating (Denies straining to urinate ), dysuria, flank pain and hematuria  Confirms sensation of incomplete bladder emptying, weaker stream, intermittent stream,    Musculoskeletal: Negative  Skin: Negative  Allergic/Immunologic: Negative  Neurological: Negative  Hematological: Negative  Psychiatric/Behavioral: Negative  AUA SYMPTOM SCORE      Most Recent Value   AUA SYMPTOM SCORE   How often have you had a sensation of not emptying your bladder completely after you finished urinating? 4   How often have you had to urinate again less than two hours after you finished urinating? 4   How often have you found you stopped and started again several times when you urinate? 5   How often have you found it difficult to postpone urination? 5   How often have you had a weak urinary stream?  5   How often have you had to push or strain to begin urination? 0   How many times did you most typically get up to urinate from the time you went to bed at night until the time you got up in the morning? 2   Quality of Life: If you were to spend the rest of your life with your urinary condition just the way it is now, how would you feel about that?  5   AUA SYMPTOM SCORE  25          Vitals  Vitals:    04/26/21 0815   Weight: 90 7 kg (200 lb)   Height: 5' 9" (1 753 m)       Physical Exam  Constitutional:       General: He is not in acute distress  Appearance: Normal appearance  He is not ill-appearing, toxic-appearing or diaphoretic  HENT:      Head: Normocephalic and atraumatic     Eyes:      Conjunctiva/sclera: Conjunctivae normal    Neck: Musculoskeletal: Normal range of motion  Pulmonary:      Effort: Pulmonary effort is normal  No respiratory distress  Abdominal:      General: There is no distension  Palpations: Abdomen is soft  Tenderness: There is no abdominal tenderness  There is no right CVA tenderness, left CVA tenderness, guarding or rebound  Genitourinary:     Comments: Digital rectal exam revealed smooth, enlarged prostate, without appreciable nodule, induration or asymmetry  Musculoskeletal: Normal range of motion  Skin:     General: Skin is warm and dry  Neurological:      General: No focal deficit present  Mental Status: He is alert and oriented to person, place, and time  Psychiatric:         Mood and Affect: Mood normal          Behavior: Behavior normal          Thought Content: Thought content normal          Judgment: Judgment normal        Past History  Past Medical History:   Diagnosis Date    Arthritis     BPH with obstruction/lower urinary tract symptoms     CPAP (continuous positive airway pressure) dependence     Enlarged prostate     Erectile dysfunction     Feeling of incomplete bladder emptying     GERD (gastroesophageal reflux disease)     Microscopic hematuria     Poor urinary stream     Shortness of breath     Sleep apnea     Urgency of urination      Social History     Socioeconomic History    Marital status:      Spouse name: None    Number of children: None    Years of education: None    Highest education level: None   Occupational History    None   Social Needs    Financial resource strain: None    Food insecurity     Worry: None     Inability: None    Transportation needs     Medical: None     Non-medical: None   Tobacco Use    Smoking status: Former Smoker    Smokeless tobacco: Never Used   Substance and Sexual Activity    Alcohol use:  Yes     Alcohol/week: 2 0 standard drinks     Types: 2 Cans of beer per week     Comment: rare    Drug use: No    Sexual activity: None   Lifestyle    Physical activity     Days per week: None     Minutes per session: None    Stress: None   Relationships    Social connections     Talks on phone: None     Gets together: None     Attends Spiritism service: None     Active member of club or organization: None     Attends meetings of clubs or organizations: None     Relationship status: None    Intimate partner violence     Fear of current or ex partner: None     Emotionally abused: None     Physically abused: None     Forced sexual activity: None   Other Topics Concern    None   Social History Narrative    None     Social History     Tobacco Use   Smoking Status Former Smoker   Smokeless Tobacco Never Used     No family history on file      The following portions of the patient's history were reviewed and updated as appropriate: allergies, current medications, past medical history, past social history, past surgical history and problem list     Results  Recent Results (from the past 1 hour(s))   POCT urine dip auto non-scope    Collection Time: 04/26/21  8:25 AM   Result Value Ref Range     COLOR,UA yellow     CLARITY,UA clear     SPECIFIC GRAVITY,UA 1 025      PH,UA 7 0     LEUKOCYTE ESTERASE,UA neg     NITRITE,UA neg     GLUCOSE, UA neg     KETONES,UA neg     BILIRUBIN,UA neg     BLOOD,UA trace     POCT URINE PROTEIN neg     SL AMB POCT UROBILINOGEN 0 2    ]  Lab Results   Component Value Date    PSA 3 8 04/17/2021    PSA 2 9 10/05/2020     Lab Results   Component Value Date    CALCIUM 9 4 10/05/2020    K 4 2 10/05/2020    CO2 29 10/05/2020     (H) 10/05/2020    BUN 19 10/05/2020    CREATININE 0 81 10/05/2020     Lab Results   Component Value Date    WBC 5 77 10/05/2020    HGB 16 2 10/05/2020    HCT 47 6 10/05/2020    MCV 95 10/05/2020     10/05/2020     Fredrick Arreaga PA-C

## 2021-04-27 DIAGNOSIS — N40.1 BPH WITH OBSTRUCTION/LOWER URINARY TRACT SYMPTOMS: ICD-10-CM

## 2021-04-27 DIAGNOSIS — N13.8 BPH WITH OBSTRUCTION/LOWER URINARY TRACT SYMPTOMS: ICD-10-CM

## 2021-04-27 RX ORDER — TAMSULOSIN HYDROCHLORIDE 0.4 MG/1
0.4 CAPSULE ORAL 2 TIMES DAILY
Qty: 180 CAPSULE | Refills: 3 | Status: SHIPPED | OUTPATIENT
Start: 2021-04-27 | End: 2022-05-09

## 2021-10-25 ENCOUNTER — APPOINTMENT (OUTPATIENT)
Dept: LAB | Facility: CLINIC | Age: 71
End: 2021-10-25
Payer: COMMERCIAL

## 2021-10-25 DIAGNOSIS — R97.20 ELEVATED PSA: ICD-10-CM

## 2021-10-25 LAB — PSA SERPL-MCNC: 3.7 NG/ML (ref 0–4)

## 2021-10-25 PROCEDURE — 36415 COLL VENOUS BLD VENIPUNCTURE: CPT

## 2021-10-25 PROCEDURE — G0103 PSA SCREENING: HCPCS

## 2021-10-27 ENCOUNTER — OFFICE VISIT (OUTPATIENT)
Dept: UROLOGY | Facility: MEDICAL CENTER | Age: 71
End: 2021-10-27
Payer: COMMERCIAL

## 2021-10-27 VITALS
SYSTOLIC BLOOD PRESSURE: 130 MMHG | HEIGHT: 69 IN | DIASTOLIC BLOOD PRESSURE: 90 MMHG | WEIGHT: 206 LBS | HEART RATE: 88 BPM | BODY MASS INDEX: 30.51 KG/M2

## 2021-10-27 DIAGNOSIS — N40.1 BPH WITH OBSTRUCTION/LOWER URINARY TRACT SYMPTOMS: ICD-10-CM

## 2021-10-27 DIAGNOSIS — N13.8 BPH WITH OBSTRUCTION/LOWER URINARY TRACT SYMPTOMS: ICD-10-CM

## 2021-10-27 DIAGNOSIS — R31.29 MICROHEMATURIA: Primary | ICD-10-CM

## 2021-10-27 DIAGNOSIS — R97.20 ELEVATED PSA: ICD-10-CM

## 2021-10-27 LAB
AMORPH URATE CRY URNS QL MICRO: NORMAL /HPF
BACTERIA UR QL AUTO: NORMAL /HPF
BILIRUB UR QL STRIP: NEGATIVE
CLARITY UR: NORMAL
COLOR UR: YELLOW
GLUCOSE UR STRIP-MCNC: NEGATIVE MG/DL
HGB UR QL STRIP.AUTO: NEGATIVE
KETONES UR STRIP-MCNC: NEGATIVE MG/DL
LEUKOCYTE ESTERASE UR QL STRIP: NEGATIVE
NITRITE UR QL STRIP: NEGATIVE
NON-SQ EPI CELLS URNS QL MICRO: NORMAL /HPF
PH UR STRIP.AUTO: 7 [PH]
PROT UR STRIP-MCNC: NEGATIVE MG/DL
RBC #/AREA URNS AUTO: NORMAL /HPF
SL AMB  POCT GLUCOSE, UA: ABNORMAL
SL AMB LEUKOCYTE ESTERASE,UA: ABNORMAL
SL AMB POCT BILIRUBIN,UA: ABNORMAL
SL AMB POCT BLOOD,UA: ABNORMAL
SL AMB POCT CLARITY,UA: CLEAR
SL AMB POCT COLOR,UA: YELLOW
SL AMB POCT KETONES,UA: ABNORMAL
SL AMB POCT NITRITE,UA: ABNORMAL
SL AMB POCT PH,UA: 7
SL AMB POCT SPECIFIC GRAVITY,UA: 1.02
SL AMB POCT URINE PROTEIN: ABNORMAL
SL AMB POCT UROBILINOGEN: 0.2
SP GR UR STRIP.AUTO: 1.02 (ref 1–1.03)
UROBILINOGEN UR QL STRIP.AUTO: 0.2 E.U./DL
WBC #/AREA URNS AUTO: NORMAL /HPF

## 2021-10-27 PROCEDURE — 99214 OFFICE O/P EST MOD 30 MIN: CPT | Performed by: PHYSICIAN ASSISTANT

## 2021-10-27 PROCEDURE — 81001 URINALYSIS AUTO W/SCOPE: CPT | Performed by: PHYSICIAN ASSISTANT

## 2021-10-27 PROCEDURE — 81003 URINALYSIS AUTO W/O SCOPE: CPT | Performed by: PHYSICIAN ASSISTANT

## 2021-10-27 RX ORDER — TADALAFIL 20 MG/1
20 TABLET ORAL
COMMUNITY
Start: 2021-07-26 | End: 2022-06-20 | Stop reason: ALTCHOICE

## 2022-04-27 ENCOUNTER — APPOINTMENT (OUTPATIENT)
Dept: LAB | Facility: CLINIC | Age: 72
End: 2022-04-27
Payer: COMMERCIAL

## 2022-04-27 DIAGNOSIS — R97.20 ELEVATED PSA: ICD-10-CM

## 2022-04-27 LAB — PSA SERPL-MCNC: 3.8 NG/ML (ref 0–4)

## 2022-04-27 PROCEDURE — 84153 ASSAY OF PSA TOTAL: CPT

## 2022-05-04 ENCOUNTER — OFFICE VISIT (OUTPATIENT)
Dept: UROLOGY | Facility: MEDICAL CENTER | Age: 72
End: 2022-05-04
Payer: COMMERCIAL

## 2022-05-04 VITALS
DIASTOLIC BLOOD PRESSURE: 80 MMHG | HEART RATE: 75 BPM | SYSTOLIC BLOOD PRESSURE: 120 MMHG | WEIGHT: 209 LBS | BODY MASS INDEX: 30.96 KG/M2 | HEIGHT: 69 IN

## 2022-05-04 DIAGNOSIS — Z12.5 PROSTATE CANCER SCREENING: Primary | ICD-10-CM

## 2022-05-04 PROCEDURE — 99214 OFFICE O/P EST MOD 30 MIN: CPT | Performed by: PHYSICIAN ASSISTANT

## 2022-05-04 NOTE — PATIENT INSTRUCTIONS
PSA is due on or after 4/27/22  Will schedule cystoscopy with MD in the office for further evaluation of your prostate

## 2022-05-04 NOTE — PROGRESS NOTES
5/4/2022      Chief Complaint   Patient presents with    Benign Prostatic Hypertrophy         Assessment and Plan    67 y o  male managed by our office  1  Prostate cancer screening   · PSA: 3 8 (4/27/22)  · Previous PSAs: 3 7 (10/25/21), 3 8 (4/17/21), 2 9 (10/5/20), 3 98 (8/19/20), 3 52 (2/13/20)  · S/p negative transperineal bx 10/13/20  · PSA has remained stable, will follow up in 1 year with PSA  2  BPH   · Patient symptoms persist on Flomax 0 8 mg po daily and with dietary modifications  · AUA score: 21   · Will schedule cystoscopy with MD for further evaluation/surgical work up  · Detailed discussion today regarding surgical options  All questions answered  History of Present Illness  Lucila Ortiz is a 67 y o  male here for 6 months follow up of elevated PSA and BPH  Patient is s/p transperineal biopsy on 10/3/2020 with Dr Virginia Templeton which was benign  Patient's PSA has remained stable  Please see trend above  Regarding his BPH, at his previous visit we discussed Kegel exercises and bladder irritants conjunction with his Flomax 0 8 mg daily  Patient presents today and states his urinary symptoms have remained the same  He continues to have issues with postvoid dribbling which is most bothersome to him  He has nocturia 2 times per night and daytime urinary frequency  He has noticed an association with bladder irritants and has noticed some improvement with dietary modifications  He denies any hematuria or dysuria  He denies any recent fevers or chills  He denies any flank or abdominal pain  He is agreeable to plan above  Review of Systems   Constitutional: Negative for chills and fever  HENT: Negative  Respiratory: Negative  Cardiovascular: Negative  Gastrointestinal: Negative for abdominal pain, nausea and vomiting  Genitourinary: Positive for frequency   Negative for difficulty urinating, dysuria, flank pain, hematuria, scrotal swelling, testicular pain and urgency  Post void dribbling is most bothersome symptom  Nocturia 2x per night  Denies sensation of incomplete bladder emptying  Musculoskeletal: Negative  Skin: Negative  Neurological: Negative  AUA SYMPTOM SCORE      Most Recent Value   AUA SYMPTOM SCORE    How often have you had a sensation of not emptying your bladder completely after you finished urinating? 4   How often have you had to urinate again less than two hours after you finished urinating? 5   How often have you found you stopped and started again several times when you urinate? 4   How often have you found it difficult to postpone urination? 3   How often have you had a weak urinary stream? 2   How often have you had to push or strain to begin urination? 1   How many times did you most typically get up to urinate from the time you went to bed at night until the time you got up in the morning? 2   Quality of Life: If you were to spend the rest of your life with your urinary condition just the way it is now, how would you feel about that? 5   AUA SYMPTOM SCORE 21           Vitals  Vitals:    05/04/22 0830   BP: 120/80   Pulse: 75   Weight: 94 8 kg (209 lb)   Height: 5' 9" (1 753 m)       Physical Exam  Vitals reviewed  Constitutional:       General: He is not in acute distress  Appearance: Normal appearance  He is obese  He is not ill-appearing, toxic-appearing or diaphoretic  HENT:      Head: Normocephalic and atraumatic  Eyes:      Conjunctiva/sclera: Conjunctivae normal    Pulmonary:      Effort: Pulmonary effort is normal  No respiratory distress  Abdominal:      General: There is no distension  Palpations: Abdomen is soft  Tenderness: There is no abdominal tenderness  There is no right CVA tenderness, left CVA tenderness, guarding or rebound  Musculoskeletal:         General: Normal range of motion  Cervical back: Normal range of motion  Skin:     General: Skin is warm and dry     Neurological: General: No focal deficit present  Mental Status: He is alert and oriented to person, place, and time  Psychiatric:         Mood and Affect: Mood normal          Behavior: Behavior normal          Thought Content: Thought content normal          Judgment: Judgment normal        Past History  Past Medical History:   Diagnosis Date    Arthritis     BPH with obstruction/lower urinary tract symptoms     CPAP (continuous positive airway pressure) dependence     Enlarged prostate     Erectile dysfunction     Feeling of incomplete bladder emptying     GERD (gastroesophageal reflux disease)     Microscopic hematuria     Poor urinary stream     Shortness of breath     Sleep apnea     Urgency of urination      Social History     Socioeconomic History    Marital status:      Spouse name: None    Number of children: None    Years of education: None    Highest education level: None   Occupational History    None   Tobacco Use    Smoking status: Former Smoker    Smokeless tobacco: Never Used   Vaping Use    Vaping Use: Never used   Substance and Sexual Activity    Alcohol use: Yes     Alcohol/week: 2 0 standard drinks     Types: 2 Cans of beer per week     Comment: rare    Drug use: No    Sexual activity: None   Other Topics Concern    None   Social History Narrative    None     Social Determinants of Health     Financial Resource Strain: Not on file   Food Insecurity: Not on file   Transportation Needs: Not on file   Physical Activity: Not on file   Stress: Not on file   Social Connections: Not on file   Intimate Partner Violence: Not on file   Housing Stability: Not on file     Social History     Tobacco Use   Smoking Status Former Smoker   Smokeless Tobacco Never Used     No family history on file      The following portions of the patient's history were reviewed and updated as appropriate: allergies, current medications, past medical history, past social history, past surgical history and problem list     Results  No results found for this or any previous visit (from the past 1 hour(s)) ]  Lab Results   Component Value Date    PSA 3 8 04/27/2022    PSA 3 7 10/25/2021    PSA 3 8 04/17/2021    PSA 2 9 10/05/2020     Lab Results   Component Value Date    CALCIUM 9 4 10/05/2020    K 4 2 10/05/2020    CO2 29 10/05/2020     (H) 10/05/2020    BUN 19 10/05/2020    CREATININE 0 81 10/05/2020     Lab Results   Component Value Date    WBC 5 77 10/05/2020    HGB 16 2 10/05/2020    HCT 47 6 10/05/2020    MCV 95 10/05/2020     10/05/2020     Cruz Quintana PA-C

## 2022-05-09 DIAGNOSIS — N13.8 BPH WITH OBSTRUCTION/LOWER URINARY TRACT SYMPTOMS: ICD-10-CM

## 2022-05-09 DIAGNOSIS — N40.1 BPH WITH OBSTRUCTION/LOWER URINARY TRACT SYMPTOMS: ICD-10-CM

## 2022-05-09 RX ORDER — TAMSULOSIN HYDROCHLORIDE 0.4 MG/1
CAPSULE ORAL
Qty: 180 CAPSULE | Refills: 4 | Status: SHIPPED | OUTPATIENT
Start: 2022-05-09

## 2022-06-20 ENCOUNTER — PROCEDURE VISIT (OUTPATIENT)
Dept: UROLOGY | Facility: MEDICAL CENTER | Age: 72
End: 2022-06-20
Payer: COMMERCIAL

## 2022-06-20 VITALS
HEIGHT: 69 IN | SYSTOLIC BLOOD PRESSURE: 142 MMHG | HEART RATE: 92 BPM | OXYGEN SATURATION: 95 % | BODY MASS INDEX: 30.96 KG/M2 | WEIGHT: 209 LBS | DIASTOLIC BLOOD PRESSURE: 88 MMHG

## 2022-06-20 DIAGNOSIS — N13.8 BPH WITH OBSTRUCTION/LOWER URINARY TRACT SYMPTOMS: Primary | ICD-10-CM

## 2022-06-20 DIAGNOSIS — N52.03 COMBINED ARTERIAL INSUFFICIENCY AND CORPORO-VENOUS OCCLUSIVE ERECTILE DYSFUNCTION: ICD-10-CM

## 2022-06-20 DIAGNOSIS — N40.1 BPH WITH OBSTRUCTION/LOWER URINARY TRACT SYMPTOMS: Primary | ICD-10-CM

## 2022-06-20 LAB
POST-VOID RESIDUAL VOLUME, ML POC: 83 ML
SL AMB  POCT GLUCOSE, UA: NORMAL
SL AMB LEUKOCYTE ESTERASE,UA: NORMAL
SL AMB POCT BILIRUBIN,UA: NORMAL
SL AMB POCT BLOOD,UA: NORMAL
SL AMB POCT CLARITY,UA: CLEAR
SL AMB POCT COLOR,UA: YELLOW
SL AMB POCT KETONES,UA: NORMAL
SL AMB POCT NITRITE,UA: NORMAL
SL AMB POCT PH,UA: 6
SL AMB POCT SPECIFIC GRAVITY,UA: 1.03
SL AMB POCT URINE PROTEIN: NORMAL
SL AMB POCT UROBILINOGEN: 1

## 2022-06-20 PROCEDURE — 51736 URINE FLOW MEASUREMENT: CPT

## 2022-06-20 PROCEDURE — 76872 US TRANSRECTAL: CPT | Performed by: UROLOGY

## 2022-06-20 PROCEDURE — 51798 US URINE CAPACITY MEASURE: CPT | Performed by: UROLOGY

## 2022-06-20 PROCEDURE — 81003 URINALYSIS AUTO W/O SCOPE: CPT | Performed by: UROLOGY

## 2022-06-20 PROCEDURE — 99214 OFFICE O/P EST MOD 30 MIN: CPT | Performed by: UROLOGY

## 2022-06-20 PROCEDURE — 52000 CYSTOURETHROSCOPY: CPT | Performed by: UROLOGY

## 2022-06-20 RX ORDER — SILDENAFIL 100 MG/1
100 TABLET, FILM COATED ORAL AS NEEDED
Qty: 10 TABLET | Refills: 1 | Status: SHIPPED | OUTPATIENT
Start: 2022-06-20 | End: 2023-06-20

## 2022-06-20 RX ORDER — FINASTERIDE 5 MG/1
5 TABLET, FILM COATED ORAL DAILY
Qty: 90 TABLET | Refills: 3 | Status: SHIPPED | OUTPATIENT
Start: 2022-06-20

## 2022-06-20 NOTE — LETTER
June 20, 2022     Jessica Ji, [address] [address]    Patient: Teresa Mills   YOB: 1950   Date of Visit: 6/20/2022       Dear Dr Carlos Taylor: Thank you for referring Becki Shah to me for evaluation  Below are my notes for this consultation  If you have questions, please do not hesitate to call me  I look forward to following your patient along with you  Sincerely,        Magda Patel MD        CC: No Recipients  Magda Patel MD  6/20/2022 10:33 AM  Sign when Signing Visit  Assessment/Plan:    BPH with obstruction/lower urinary tract symptoms  AUA symptom score is 23 on tamsulosin 0 8  He is unhappy with his voiding pattern  Cystoscopy documents a large obstructing prostate  PSA was stable at 3 8 in April 2022  We had a long discussion regarding treatment options  Specifically we discussed combined medical therapy, urolift, laser prostatectomy with the GreenLight laser and TURP  The pros and cons were of each were discussed  The risks were discussed  At the clear discussion the patient thought he was most interested in the use of the GreenLight laser  He does wish to defer any treatment until the fall as he works [place]  With this in mind I placed him on finasteride 5 mg daily to see if this improved his voiding pattern  He will return in 3-4 months for follow-up  Combined arterial insufficiency and corporo-venous occlusive erectile dysfunction  The patient has long history of erectile dysfunction  He has tried Viagra in the past   He wish to retry the medication again  Use and side effects were discussed  Diagnoses and all orders for this visit:    BPH with obstruction/lower urinary tract symptoms  -     POCT urine dip auto non-scope  -     finasteride (PROSCAR) 5 mg tablet;  Take 1 tablet (5 mg total) by mouth daily    Combined arterial insufficiency and corporo-venous occlusive erectile dysfunction  - sildenafil (VIAGRA) 100 mg tablet; Take 1 tablet (100 mg total) by mouth as needed for erectile dysfunction    Other orders  -     Cystoscopy          Subjective:      Patient ID: Corrine Schwab is a 67 y o  male  Benign Prostatic Hypertrophy  This is a chronic problem  The current episode started more than 1 year ago  The problem has been gradually worsening since onset  Irritative symptoms include frequency, nocturia (2-3 times) and urgency  Obstructive symptoms include dribbling, incomplete emptying, an intermittent stream, a slower stream and a weak stream  Pertinent negatives include no chills, dysuria, genital pain, hematuria, hesitancy, nausea or vomiting  AUA score is 20-35  His sexual activity is non-contributory to the current illness  The symptoms are aggravated by alcohol  Past treatments include tamsulosin  The treatment provided no relief  He has been using treatment for 2 or more years  Erectile Dysfunction  This is a chronic problem  The current episode started more than 1 year ago  The problem is unchanged  The nature of his difficulty is achieving erection  Irritative symptoms include frequency, nocturia (2-3 times) and urgency  Obstructive symptoms include dribbling, incomplete emptying, an intermittent stream, a slower stream and a weak stream  Pertinent negatives include no chills, dysuria, genital pain, hematuria or hesitancy  Nothing aggravates the symptoms  Past treatments include sildenafil and tadalafil  Improvement on treatment: He did well with sildenafil in the past  He has had no adverse reactions caused by medications  Risk factors include BPH  The following portions of the patient's history were reviewed and updated as appropriate: allergies, current medications, past family history, past medical history, past social history, past surgical history and problem list     Review of Systems   Constitutional: Negative for chills, diaphoresis, fatigue and fever  HENT: Negative  Eyes: Negative  Respiratory: Negative  Cardiovascular: Negative  Gastrointestinal: Negative for nausea and vomiting  Endocrine: Negative  Genitourinary: Positive for frequency, incomplete emptying, nocturia (2-3 times) and urgency  Negative for dysuria, hematuria and hesitancy  See HPI   Musculoskeletal: Negative  Skin: Negative  Allergic/Immunologic: Negative  Neurological: Negative  Hematological: Negative  Psychiatric/Behavioral: Negative  AUA SYMPTOM SCORE    Flowsheet Row Most Recent Value   AUA SYMPTOM SCORE    How often have you had a sensation of not emptying your bladder completely after you finished urinating? 4   How often have you had to urinate again less than two hours after you finished urinating? 5   How often have you found you stopped and started again several times when you urinate? 5   How often have you found it difficult to postpone urination? 3   How often have you had a weak urinary stream? 3   How often have you had to push or strain to begin urination? 0   How many times did you most typically get up to urinate from the time you went to bed at night until the time you got up in the morning? 3   Quality of Life: If you were to spend the rest of your life with your urinary condition just the way it is now, how would you feel about that? 5   AUA SYMPTOM SCORE 23      Objective:      /88   Pulse 92   Ht 5' 9" (1 753 m)   Wt 94 8 kg (209 lb)   SpO2 95%   BMI 30 86 kg/m²          Physical Exam  Vitals reviewed  Constitutional:       General: He is not in acute distress  Appearance: Normal appearance  He is well-developed  He is obese  He is not ill-appearing, toxic-appearing or diaphoretic  HENT:      Head: Normocephalic and atraumatic  Eyes:      General: No scleral icterus  Conjunctiva/sclera: Conjunctivae normal    Cardiovascular:      Rate and Rhythm: Normal rate     Pulmonary:      Effort: Pulmonary effort is normal  Abdominal:      General: Bowel sounds are normal  There is no distension  Palpations: Abdomen is soft  There is no mass  Tenderness: There is no abdominal tenderness  There is no right CVA tenderness, left CVA tenderness, guarding or rebound  Genitourinary:     Penis: Normal  No phimosis or hypospadias  Testes: Normal          Right: Mass not present  Left: Mass not present  Rectum: Normal       Comments: Prostate moderately enlarged and palpably benign  Musculoskeletal:         General: Normal range of motion  Cervical back: Neck supple  Skin:     General: Skin is warm and dry  Neurological:      General: No focal deficit present  Mental Status: He is alert and oriented to person, place, and time  Psychiatric:         Mood and Affect: Mood normal          Behavior: Behavior normal          Thought Content: Thought content normal          Judgment: Judgment normal             Cystoscopy     Date/Time 6/20/2022 10:04 AM     Performed by  Nathaly Gomez MD     Authorized by Nathaly Gomez MD      Universal Protocol:  Consent: Verbal consent obtained  Written consent obtained  Risks and benefits: risks, benefits and alternatives were discussed  Consent given by: patient  Patient understanding: patient states understanding of the procedure being performed  Patient consent: the patient's understanding of the procedure matches consent given  Procedure consent: procedure consent matches procedure scheduled  Patient identity confirmed: verbally with patient        Procedure Details:  Procedure type: cystoscopy    Patient tolerance: Patient tolerated the procedure well with no immediate complications    Additional Procedure Details: The patient presents for cystoscopy, transrectal ultrasound and uroflow study for further evaluation of his lower urinary tract symptoms  I have discussed the reasons for the testing and the potential risks and complications    Patient expressed understanding, and signed informed consent document  Flexible Cystoscopy    The patient was carefully  positioned supine on the examining table  He was then prepped and draped in the usual sterile fashion     Xylocaine jelly was instilled in the urethra and left  indwelling to affect local anesthesia  Flexible cystoscopy was then performed with the 15 [place] flexible cystoscope with the following findings  Urethra:  Normal without stricture    Prostate:  lateral lobes -bilobar obstruction approximately 60 g  There is anterior obstruction by intravesical prostate tissue  median lobe -no significant median lobe ill there is elevation of the median bar  Bladder:  Heavily trabeculated with cellules and diverticula , no lesions, tumor, or stones  Residual urine:  Small    The patient tolerated the cystoscopy well  He was placed on his left side in preparation for transrectal ultrasound of prostate  Transrectal Ultrasound of the Prostate    Transrectal ultrasound was performed with the transrectal probe at 8 megahertz  Digital rectal examination was performed with findings of moderately enlarged benign-feeling prostate  The lubricated transrectal probe inserted into the rectum  The seminal vessels are normal in appearance  The prostate is enlarged measuring 71 gm  Prostatic height is 5 cm, length 6 3 cm and width 4 3 cm  No hypoechoic lesion is noted in the peripheral zone  The transition zone is heterogeneous in echotexture with scattered cysts and calcifications  A median lobe is not seen  Visualized bladder is unremarkable  Impression:  Prostatic enlargement        Uroflow study    The patient voided 268 cc with a maximum flow rate of 24 1 ml/sec (valsalva) and an average flow rate of 4 1 ml/sec  The overall voiding pattern is prolonged  Postvoid residual is 83 cc  Impression:  Prolonged uroflow consistent with outlet obstruction

## 2022-06-20 NOTE — ASSESSMENT & PLAN NOTE
AUA symptom score is 23 on tamsulosin 0 8  He is unhappy with his voiding pattern  Cystoscopy documents a large obstructing prostate  PSA was stable at 3 8 in April 2022  We had a long discussion regarding treatment options  Specifically we discussed combined medical therapy, urolift, laser prostatectomy with the GreenLight laser and TURP  The pros and cons were of each were discussed  The risks were discussed  At the clear discussion the patient thought he was most interested in the use of the GreenLight laser  He does wish to defer any treatment until the fall as he [place]  With this in mind I placed him on finasteride 5 mg daily to see if this improved his voiding pattern  He will return in 3-4 months for follow-up

## 2022-06-20 NOTE — ASSESSMENT & PLAN NOTE
The patient has long history of erectile dysfunction  He has tried Viagra in the past   He wish to retry the medication again  Use and side effects were discussed

## 2022-06-20 NOTE — PROGRESS NOTES
Uroflow    Date/Time: 6/20/2022 11:00 AM  Performed by: Donald Hammans, MA  Authorized by: Donald Hammans, MA   Procedure - Urodynamics:  Procedure details: Simple Uroflow          Comments:      Uroflow is performed following cystoscopy  The results are reviewed by Dr Brigida Carver and noted in his procedure note

## 2022-06-20 NOTE — PROGRESS NOTES
Assessment/Plan:    BPH with obstruction/lower urinary tract symptoms  AUA symptom score is 23 on tamsulosin 0 8  He is unhappy with his voiding pattern  Cystoscopy documents a large obstructing prostate  PSA was stable at 3 8 in April 2022  We had a long discussion regarding treatment options  Specifically we discussed combined medical therapy, urolift, laser prostatectomy with the GreenLight laser and TURP  The pros and cons were of each were discussed  The risks were discussed  At the clear discussion the patient thought he was most interested in the use of the GreenLight laser  He does wish to defer any treatment until the fall as he works [place]  With this in mind I placed him on finasteride 5 mg daily to see if this improved his voiding pattern  He will return in 3-4 months for follow-up  Combined arterial insufficiency and corporo-venous occlusive erectile dysfunction  The patient has long history of erectile dysfunction  He has tried Viagra in the past   He wish to retry the medication again  Use and side effects were discussed  Diagnoses and all orders for this visit:    BPH with obstruction/lower urinary tract symptoms  -     POCT urine dip auto non-scope  -     finasteride (PROSCAR) 5 mg tablet; Take 1 tablet (5 mg total) by mouth daily    Combined arterial insufficiency and corporo-venous occlusive erectile dysfunction  -     sildenafil (VIAGRA) 100 mg tablet; Take 1 tablet (100 mg total) by mouth as needed for erectile dysfunction    Other orders  -     Cystoscopy          Subjective:      Patient ID: Óscar Bejarano is a 67 y o  male  Benign Prostatic Hypertrophy  This is a chronic problem  The current episode started more than 1 year ago  The problem has been gradually worsening since onset  Irritative symptoms include frequency, nocturia (2-3 times) and urgency   Obstructive symptoms include dribbling, incomplete emptying, an intermittent stream, a slower stream and a weak stream  Pertinent negatives include no chills, dysuria, genital pain, hematuria, hesitancy, nausea or vomiting  AUA score is 20-35  His sexual activity is non-contributory to the current illness  The symptoms are aggravated by alcohol  Past treatments include tamsulosin  The treatment provided no relief  He has been using treatment for 2 or more years  Erectile Dysfunction  This is a chronic problem  The current episode started more than 1 year ago  The problem is unchanged  The nature of his difficulty is achieving erection  Irritative symptoms include frequency, nocturia (2-3 times) and urgency  Obstructive symptoms include dribbling, incomplete emptying, an intermittent stream, a slower stream and a weak stream  Pertinent negatives include no chills, dysuria, genital pain, hematuria or hesitancy  Nothing aggravates the symptoms  Past treatments include sildenafil and tadalafil  Improvement on treatment: He did well with sildenafil in the past  He has had no adverse reactions caused by medications  Risk factors include BPH  The following portions of the patient's history were reviewed and updated as appropriate: allergies, current medications, past family history, past medical history, past social history, past surgical history and problem list     Review of Systems   Constitutional: Negative for chills, diaphoresis, fatigue and fever  HENT: Negative  Eyes: Negative  Respiratory: Negative  Cardiovascular: Negative  Gastrointestinal: Negative for nausea and vomiting  Endocrine: Negative  Genitourinary: Positive for frequency, incomplete emptying, nocturia (2-3 times) and urgency  Negative for dysuria, hematuria and hesitancy  See HPI   Musculoskeletal: Negative  Skin: Negative  Allergic/Immunologic: Negative  Neurological: Negative  Hematological: Negative  Psychiatric/Behavioral: Negative          AUA SYMPTOM SCORE    Flowsheet Row Most Recent Value   AUA SYMPTOM SCORE    How often have you had a sensation of not emptying your bladder completely after you finished urinating? 4   How often have you had to urinate again less than two hours after you finished urinating? 5   How often have you found you stopped and started again several times when you urinate? 5   How often have you found it difficult to postpone urination? 3   How often have you had a weak urinary stream? 3   How often have you had to push or strain to begin urination? 0   How many times did you most typically get up to urinate from the time you went to bed at night until the time you got up in the morning? 3   Quality of Life: If you were to spend the rest of your life with your urinary condition just the way it is now, how would you feel about that? 5   AUA SYMPTOM SCORE 23      Objective:      /88   Pulse 92   Ht 5' 9" (1 753 m)   Wt 94 8 kg (209 lb)   SpO2 95%   BMI 30 86 kg/m²          Physical Exam  Vitals reviewed  Constitutional:       General: He is not in acute distress  Appearance: Normal appearance  He is well-developed  He is obese  He is not ill-appearing, toxic-appearing or diaphoretic  HENT:      Head: Normocephalic and atraumatic  Eyes:      General: No scleral icterus  Conjunctiva/sclera: Conjunctivae normal    Cardiovascular:      Rate and Rhythm: Normal rate  Pulmonary:      Effort: Pulmonary effort is normal    Abdominal:      General: Bowel sounds are normal  There is no distension  Palpations: Abdomen is soft  There is no mass  Tenderness: There is no abdominal tenderness  There is no right CVA tenderness, left CVA tenderness, guarding or rebound  Genitourinary:     Penis: Normal  No phimosis or hypospadias  Testes: Normal          Right: Mass not present  Left: Mass not present  Rectum: Normal       Comments: Prostate moderately enlarged and palpably benign  Musculoskeletal:         General: Normal range of motion  Cervical back: Neck supple  Skin:     General: Skin is warm and dry  Neurological:      General: No focal deficit present  Mental Status: He is alert and oriented to person, place, and time  Psychiatric:         Mood and Affect: Mood normal          Behavior: Behavior normal          Thought Content: Thought content normal          Judgment: Judgment normal             Cystoscopy     Date/Time 6/20/2022 10:04 AM     Performed by  Perfecto Aguilar MD     Authorized by Perfecto Aguilar MD      Universal Protocol:  Consent: Verbal consent obtained  Written consent obtained  Risks and benefits: risks, benefits and alternatives were discussed  Consent given by: patient  Patient understanding: patient states understanding of the procedure being performed  Patient consent: the patient's understanding of the procedure matches consent given  Procedure consent: procedure consent matches procedure scheduled  Patient identity confirmed: verbally with patient        Procedure Details:  Procedure type: cystoscopy    Patient tolerance: Patient tolerated the procedure well with no immediate complications    Additional Procedure Details: The patient presents for cystoscopy, transrectal ultrasound and uroflow study for further evaluation of his lower urinary tract symptoms  I have discussed the reasons for the testing and the potential risks and complications  Patient expressed understanding, and signed informed consent document  Flexible Cystoscopy    The patient was carefully  positioned supine on the examining table  He was then prepped and draped in the usual sterile fashion     Xylocaine jelly was instilled in the urethra and left  indwelling to affect local anesthesia  Flexible cystoscopy was then performed with the 15 [place] flexible cystoscope with the following findings  Urethra:  Normal without stricture    Prostate:  lateral lobes -bilobar obstruction approximately 60 g   There is anterior obstruction by intravesical prostate tissue  median lobe -no significant median lobe ill there is elevation of the median bar  Bladder:  Heavily trabeculated with cellules and diverticula , no lesions, tumor, or stones  Residual urine:  Small    The patient tolerated the cystoscopy well  He was placed on his left side in preparation for transrectal ultrasound of prostate  Transrectal Ultrasound of the Prostate    Transrectal ultrasound was performed with the transrectal probe at 8 megahertz  Digital rectal examination was performed with findings of moderately enlarged benign-feeling prostate  The lubricated transrectal probe inserted into the rectum  The seminal vessels are normal in appearance  The prostate is enlarged measuring 71 gm  Prostatic height is 5 cm, length 6 3 cm and width 4 3 cm  No hypoechoic lesion is noted in the peripheral zone  The transition zone is heterogeneous in echotexture with scattered cysts and calcifications  A median lobe is not seen  Visualized bladder is unremarkable  Impression:  Prostatic enlargement        Uroflow study    The patient voided 268 cc with a maximum flow rate of 24 1 ml/sec (valsalva) and an average flow rate of 4 1 ml/sec  The overall voiding pattern is prolonged  Postvoid residual is 83 cc  Impression:  Prolonged uroflow consistent with outlet obstruction

## 2022-10-04 ENCOUNTER — OFFICE VISIT (OUTPATIENT)
Dept: UROLOGY | Facility: MEDICAL CENTER | Age: 72
End: 2022-10-04
Payer: COMMERCIAL

## 2022-10-04 VITALS
HEIGHT: 69 IN | BODY MASS INDEX: 31.84 KG/M2 | HEART RATE: 83 BPM | WEIGHT: 215 LBS | SYSTOLIC BLOOD PRESSURE: 118 MMHG | DIASTOLIC BLOOD PRESSURE: 84 MMHG | OXYGEN SATURATION: 98 %

## 2022-10-04 DIAGNOSIS — N52.03 COMBINED ARTERIAL INSUFFICIENCY AND CORPORO-VENOUS OCCLUSIVE ERECTILE DYSFUNCTION: ICD-10-CM

## 2022-10-04 DIAGNOSIS — N40.1 BPH WITH OBSTRUCTION/LOWER URINARY TRACT SYMPTOMS: Primary | ICD-10-CM

## 2022-10-04 DIAGNOSIS — N13.8 BPH WITH OBSTRUCTION/LOWER URINARY TRACT SYMPTOMS: Primary | ICD-10-CM

## 2022-10-04 DIAGNOSIS — R31.29 MICROHEMATURIA: ICD-10-CM

## 2022-10-04 LAB
SL AMB  POCT GLUCOSE, UA: ABNORMAL
SL AMB LEUKOCYTE ESTERASE,UA: ABNORMAL
SL AMB POCT BILIRUBIN,UA: ABNORMAL
SL AMB POCT BLOOD,UA: ABNORMAL
SL AMB POCT CLARITY,UA: CLEAR
SL AMB POCT COLOR,UA: YELLOW
SL AMB POCT KETONES,UA: ABNORMAL
SL AMB POCT NITRITE,UA: ABNORMAL
SL AMB POCT PH,UA: 5.5
SL AMB POCT SPECIFIC GRAVITY,UA: 1.03
SL AMB POCT URINE PROTEIN: ABNORMAL
SL AMB POCT UROBILINOGEN: 0.2

## 2022-10-04 PROCEDURE — 81003 URINALYSIS AUTO W/O SCOPE: CPT | Performed by: UROLOGY

## 2022-10-04 PROCEDURE — 99214 OFFICE O/P EST MOD 30 MIN: CPT | Performed by: UROLOGY

## 2022-10-04 NOTE — ASSESSMENT & PLAN NOTE
AUA symptom score is 15 on combined medical therapy  He is still not satisfied his voiding pattern  Options were discussed  We will proceed with photoselective vaporization the prostate later this year after his work concludes  The procedure was described  Risks were discussed and consent form signed

## 2022-10-04 NOTE — ASSESSMENT & PLAN NOTE
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

## 2022-10-04 NOTE — ASSESSMENT & PLAN NOTE
Small blood is noted on dipstick  We will screen the upper tracts with a renal ultrasound  Cystoscopy did not identify any bladder tumor at his last appointment

## 2022-10-05 ENCOUNTER — TELEPHONE (OUTPATIENT)
Dept: UROLOGY | Facility: MEDICAL CENTER | Age: 72
End: 2022-10-05

## 2022-10-05 NOTE — TELEPHONE ENCOUNTER
Patient is scheduled for [facility] for injection teaching  Seen by Tone Bell  Patient is scheduled for 12/2/22

## 2022-10-10 NOTE — TELEPHONE ENCOUNTER
Patient called in about his appt with Sherley Ho on 12/2  He was wondering since he is have surgery with Dr Katiana Joiner on 12/1 if that is an appropriate time  Please advise       Please call Andrew Minaya at [phone] [address] to leave detailed voicemail

## 2022-10-20 ENCOUNTER — HOSPITAL ENCOUNTER (OUTPATIENT)
Dept: ULTRASOUND IMAGING | Facility: MEDICAL CENTER | Age: 72
Discharge: HOME | End: 2022-10-20
Attending: UROLOGY
Payer: COMMERCIAL

## 2022-10-20 DIAGNOSIS — R31.29 MICROHEMATURIA: ICD-10-CM

## 2022-10-20 PROCEDURE — 76775 US EXAM ABDO BACK WALL LIM: CPT

## 2022-11-02 ENCOUNTER — TELEPHONE (OUTPATIENT)
Dept: UROLOGY | Facility: MEDICAL CENTER | Age: 72
End: 2022-11-02

## 2022-11-21 ENCOUNTER — APPOINTMENT (OUTPATIENT)
Dept: LAB | Facility: HOSPITAL | Age: 72
End: 2022-11-21

## 2022-11-21 DIAGNOSIS — N40.1 BPH WITH OBSTRUCTION/LOWER URINARY TRACT SYMPTOMS: ICD-10-CM

## 2022-11-21 DIAGNOSIS — Z12.5 PROSTATE CANCER SCREENING: ICD-10-CM

## 2022-11-21 DIAGNOSIS — N13.8 BPH WITH OBSTRUCTION/LOWER URINARY TRACT SYMPTOMS: ICD-10-CM

## 2022-11-21 DIAGNOSIS — R31.29 MICROHEMATURIA: ICD-10-CM

## 2022-11-21 LAB
ALBUMIN SERPL BCP-MCNC: 3.5 G/DL (ref 3.5–5)
ALP SERPL-CCNC: 72 U/L (ref 46–116)
ALT SERPL W P-5'-P-CCNC: 40 U/L (ref 12–78)
ANION GAP SERPL CALCULATED.3IONS-SCNC: 3 MMOL/L (ref 4–13)
AST SERPL W P-5'-P-CCNC: 23 U/L (ref 5–45)
BASOPHILS # BLD AUTO: 0.02 THOUSANDS/ÂΜL (ref 0–0.1)
BASOPHILS NFR BLD AUTO: 0 % (ref 0–1)
BILIRUB SERPL-MCNC: 0.55 MG/DL (ref 0.2–1)
BUN SERPL-MCNC: 17 MG/DL (ref 5–25)
CALCIUM SERPL-MCNC: 8.9 MG/DL (ref 8.3–10.1)
CHLORIDE SERPL-SCNC: 109 MMOL/L (ref 96–108)
CO2 SERPL-SCNC: 27 MMOL/L (ref 21–32)
CREAT SERPL-MCNC: 0.69 MG/DL (ref 0.6–1.3)
EOSINOPHIL # BLD AUTO: 0.13 THOUSAND/ÂΜL (ref 0–0.61)
EOSINOPHIL NFR BLD AUTO: 2 % (ref 0–6)
ERYTHROCYTE [DISTWIDTH] IN BLOOD BY AUTOMATED COUNT: 12.6 % (ref 11.6–15.1)
GFR SERPL CREATININE-BSD FRML MDRD: 94 ML/MIN/1.73SQ M
GLUCOSE SERPL-MCNC: 94 MG/DL (ref 65–140)
HCT VFR BLD AUTO: 45.7 % (ref 36.5–49.3)
HGB BLD-MCNC: 15.7 G/DL (ref 12–17)
IMM GRANULOCYTES # BLD AUTO: 0.03 THOUSAND/UL (ref 0–0.2)
IMM GRANULOCYTES NFR BLD AUTO: 1 % (ref 0–2)
LYMPHOCYTES # BLD AUTO: 1.1 THOUSANDS/ÂΜL (ref 0.6–4.47)
LYMPHOCYTES NFR BLD AUTO: 17 % (ref 14–44)
MCH RBC QN AUTO: 32.7 PG (ref 26.8–34.3)
MCHC RBC AUTO-ENTMCNC: 34.4 G/DL (ref 31.4–37.4)
MCV RBC AUTO: 95 FL (ref 82–98)
MONOCYTES # BLD AUTO: 0.6 THOUSAND/ÂΜL (ref 0.17–1.22)
MONOCYTES NFR BLD AUTO: 9 % (ref 4–12)
NEUTROPHILS # BLD AUTO: 4.58 THOUSANDS/ÂΜL (ref 1.85–7.62)
NEUTS SEG NFR BLD AUTO: 71 % (ref 43–75)
NRBC BLD AUTO-RTO: 0 /100 WBCS
PLATELET # BLD AUTO: 168 THOUSANDS/UL (ref 149–390)
PMV BLD AUTO: 10.8 FL (ref 8.9–12.7)
POTASSIUM SERPL-SCNC: 4.1 MMOL/L (ref 3.5–5.3)
PROT SERPL-MCNC: 6.7 G/DL (ref 6.4–8.4)
PSA SERPL-MCNC: 2.4 NG/ML (ref 0–4)
RBC # BLD AUTO: 4.8 MILLION/UL (ref 3.88–5.62)
SODIUM SERPL-SCNC: 139 MMOL/L (ref 135–147)
WBC # BLD AUTO: 6.46 THOUSAND/UL (ref 4.31–10.16)

## 2022-11-22 LAB — BACTERIA UR CULT: NORMAL

## 2022-11-25 NOTE — PRE-PROCEDURE INSTRUCTIONS
Pre-Surgery Instructions:   Medication Instructions   • amLODIPine (NORVASC) 2 5 mg tablet Take day of surgery  • finasteride (PROSCAR) 5 mg tablet Take night before surgery   • latanoprost (XALATAN) 0 005 % ophthalmic solution Take night before surgery   • metoprolol tartrate (LOPRESSOR) 50 mg tablet Take night before surgery   • Multiple Vitamin (MULTI-VITAMIN DAILY) TABS Stop taking 7 days prior to surgery  • [place] 1000 MG CAPS Stop taking 7 days prior to surgery  • tamsulosin (FLOMAX) 0 4 mg Take day of surgery  Covid screening negative as per patient  Reviewed with patient via phone all medication and showering instructions  Advised not to take any NSAID's, Vitamins or Herbal products for 7 days prior to the DOS  Acetaminophen products are ok to take  Instructed about NPO after midnight the night before DOS, except sips of water with allowed medications on the morning of the DOS  Informed about call from [facility] with the time to arrive for the scheduled surgery  Patient verbalized understanding

## 2022-11-28 NOTE — H&P
Assessment/Plan:  BPH with obstruction/lower urinary tract symptoms  AUA symptom score is 15 on combined medical therapy  He is still not satisfied his voiding pattern  Options were discussed  We will proceed with photoselective vaporization the prostate later this year after his work concludes  The procedure was described  Risks were discussed and consent form signed      Microhematuria  Small blood is noted on dipstick  We will screen the upper tracts with a renal ultrasound  Cystoscopy did not identify any bladder tumor at his last appointment           Diagnoses and all orders for this visit:    BPH with obstruction/lower urinary tract symptoms  -     Case request operating room: TRANSURETHRAL RESECTION OF PROSTATE W/ LASER; Standing  -     CBC and differential; Future  -     EKG 12 lead; Future  -     Urine culture; Future  -     Case request operating room: TRANSURETHRAL RESECTION OF PROSTATE W/ LASER    Microhematuria  -     POCT urine dip auto non-scope  -     US kidney and bladder; Future  -     Urine culture; Future    Combined arterial insufficiency and corporo-venous occlusive erectile dysfunction  -     Alprostadil, Vasodilator, (PROSTAGLANDIN PGE1 INJECTABLE 20 MCG/ML, STANDARD, IJ SOLN); 0 5 mL by Intracavernosal route 3 (three) times a week As needed for erectile dysfunction    Other orders  -     Diet NPO; Sips with meds; Standing  -     Place sequential compression device; Standing          Subjective:      Patient ID: Kev Gillespie is a 67 y o  male  Benign Prostatic Hypertrophy  This is a chronic problem  The current episode started more than 1 year ago  The problem has been rapidly improving since onset  Irritative symptoms include nocturia (1-2x) and urgency  Irritative symptoms do not include frequency   Obstructive symptoms include a slower stream  Obstructive symptoms do not include dribbling, incomplete emptying, an intermittent stream, straining or a weak stream  Pertinent negatives include no chills, genital pain, hematuria or hesitancy  AUA score is 8-19  He is sexually active  Nothing aggravates the symptoms  Past treatments include finasteride and tamsulosin  The treatment provided mild relief  Erectile Dysfunction  This is a chronic problem  The current episode started more than 1 year ago  The problem is unchanged  The nature of his difficulty is achieving erection and maintaining erection  Irritative symptoms include nocturia (1-2x) and urgency  Irritative symptoms do not include frequency  Obstructive symptoms include a slower stream  Obstructive symptoms do not include dribbling, incomplete emptying, an intermittent stream, straining or a weak stream  Pertinent negatives include no chills, genital pain, hematuria or hesitancy  Past treatments include sildenafil and tadalafil  The treatment provided no relief  He has had headaches caused by medications  Risk factors include hypertension and BPH  The following portions of the patient's history were reviewed and updated as appropriate: allergies, current medications, past family history, past medical history, past social history, past surgical history and problem list     Review of Systems   Constitutional: Negative  Negative for chills, diaphoresis, fatigue and fever  HENT: Negative  Eyes: Negative  Respiratory: Negative  Cardiovascular: Negative  Endocrine: Negative  Genitourinary: Positive for nocturia (1-2x) and urgency  Negative for frequency, hematuria, hesitancy and incomplete emptying  See HPI   Musculoskeletal: Negative  Skin: Negative  Allergic/Immunologic: Negative  Neurological: Negative  Hematological: Negative  Psychiatric/Behavioral: Negative          AUA SYMPTOM SCORE    Flowsheet Row Most Recent Value   AUA SYMPTOM SCORE    How often have you had a sensation of not emptying your bladder completely after you finished urinating? 3 (P)     How often have you had to urinate again less than two hours after you finished urinating? 4 (P)     How often have you found you stopped and started again several times when you urinate? 2 (P)     How often have you found it difficult to postpone urination? 1 (P)     How often have you had a weak urinary stream? 2 (P)     How often have you had to push or strain to begin urination? 1 (P)     How many times did you most typically get up to urinate from the time you went to bed at night until the time you got up in the morning? 2 (P)     Quality of Life: If you were to spend the rest of your life with your urinary condition just the way it is now, how would you feel about that? 3 (P)     AUA SYMPTOM SCORE 15 (P)         Objective:      /85   Pulse 66   Temp 97 8 °F (36 6 °C) (Temporal)   Resp 16   Ht 5' 9" (1 753 m)   Wt 97 1 kg (214 lb 1 1 oz)   SpO2 95%   BMI 31 61 kg/m²          Physical Exam  Vitals reviewed  Constitutional:       General: He is not in acute distress  Appearance: Normal appearance  He is well-developed  He is obese  He is not ill-appearing, toxic-appearing or diaphoretic  HENT:      Head: Normocephalic and atraumatic  Eyes:      General: No scleral icterus  Conjunctiva/sclera: Conjunctivae normal    Cardiovascular:      Rate and Rhythm: Normal rate  Pulmonary:      Effort: Pulmonary effort is normal    Abdominal:      General: There is no distension  Palpations: Abdomen is soft  There is no mass  Tenderness: There is no abdominal tenderness  There is no right CVA tenderness, left CVA tenderness, guarding or rebound  Hernia: No hernia is present  Comments: No hernia   Genitourinary:     Penis: Normal        Testes: Normal    Musculoskeletal:      Cervical back: Neck supple  Skin:     General: Skin is warm and dry  Neurological:      General: No focal deficit present  Mental Status: He is alert and oriented to person, place, and time     Psychiatric:         Mood and Affect: Mood normal          Behavior: Behavior normal          Thought Content:  Thought content normal          Judgment: Judgment normal

## 2022-11-30 ENCOUNTER — ANESTHESIA EVENT (OUTPATIENT)
Dept: PERIOP | Facility: HOSPITAL | Age: 72
End: 2022-11-30

## 2022-12-01 ENCOUNTER — HOSPITAL ENCOUNTER (OUTPATIENT)
Facility: HOSPITAL | Age: 72
Setting detail: OUTPATIENT SURGERY
Discharge: HOME | End: 2022-12-01
Attending: UROLOGY | Admitting: UROLOGY

## 2022-12-01 ENCOUNTER — ANESTHESIA (OUTPATIENT)
Dept: PERIOP | Facility: HOSPITAL | Age: 72
End: 2022-12-01

## 2022-12-01 VITALS
HEIGHT: 69 IN | RESPIRATION RATE: 22 BRPM | WEIGHT: 214.07 LBS | SYSTOLIC BLOOD PRESSURE: 132 MMHG | DIASTOLIC BLOOD PRESSURE: 80 MMHG | BODY MASS INDEX: 31.71 KG/M2 | OXYGEN SATURATION: 95 % | HEART RATE: 72 BPM | TEMPERATURE: 97.3 F

## 2022-12-01 DIAGNOSIS — N40.1 BPH WITH OBSTRUCTION/LOWER URINARY TRACT SYMPTOMS: Primary | ICD-10-CM

## 2022-12-01 DIAGNOSIS — N13.8 BPH WITH OBSTRUCTION/LOWER URINARY TRACT SYMPTOMS: Primary | ICD-10-CM

## 2022-12-01 RX ORDER — SODIUM CHLORIDE 9 MG/ML
125 INJECTION, SOLUTION INTRAVENOUS CONTINUOUS
Status: DISCONTINUED | OUTPATIENT
Start: 2022-12-01 | End: 2022-12-01 | Stop reason: HOSPADM

## 2022-12-01 RX ORDER — OXYCODONE HYDROCHLORIDE 5 MG/1
5 TABLET ORAL EVERY 4 HOURS PRN
Status: DISCONTINUED | OUTPATIENT
Start: 2022-12-01 | End: 2022-12-01 | Stop reason: HOSPADM

## 2022-12-01 RX ORDER — PHENAZOPYRIDINE HYDROCHLORIDE 200 MG/1
200 TABLET, FILM COATED ORAL 3 TIMES DAILY PRN
Qty: 10 TABLET | Refills: 0 | Status: SHIPPED | OUTPATIENT
Start: 2022-12-01

## 2022-12-01 RX ORDER — PHENAZOPYRIDINE HYDROCHLORIDE 200 MG/1
200 TABLET, FILM COATED ORAL
Status: DISCONTINUED | OUTPATIENT
Start: 2022-12-01 | End: 2022-12-01 | Stop reason: HOSPADM

## 2022-12-01 RX ORDER — ONDANSETRON 2 MG/ML
4 INJECTION INTRAMUSCULAR; INTRAVENOUS EVERY 6 HOURS PRN
Status: DISCONTINUED | OUTPATIENT
Start: 2022-12-01 | End: 2022-12-01 | Stop reason: HOSPADM

## 2022-12-01 RX ORDER — FUROSEMIDE 10 MG/ML
INJECTION INTRAMUSCULAR; INTRAVENOUS AS NEEDED
Status: DISCONTINUED | OUTPATIENT
Start: 2022-12-01 | End: 2022-12-01

## 2022-12-01 RX ORDER — CEFAZOLIN SODIUM 2 G/50ML
2000 SOLUTION INTRAVENOUS ONCE
Status: DISCONTINUED | OUTPATIENT
Start: 2022-12-01 | End: 2022-12-01 | Stop reason: HOSPADM

## 2022-12-01 RX ORDER — PROPOFOL 10 MG/ML
INJECTION, EMULSION INTRAVENOUS AS NEEDED
Status: DISCONTINUED | OUTPATIENT
Start: 2022-12-01 | End: 2022-12-01

## 2022-12-01 RX ORDER — ONDANSETRON 2 MG/ML
INJECTION INTRAMUSCULAR; INTRAVENOUS AS NEEDED
Status: DISCONTINUED | OUTPATIENT
Start: 2022-12-01 | End: 2022-12-01

## 2022-12-01 RX ORDER — MAGNESIUM HYDROXIDE 1200 MG/15ML
LIQUID ORAL AS NEEDED
Status: DISCONTINUED | OUTPATIENT
Start: 2022-12-01 | End: 2022-12-01 | Stop reason: HOSPADM

## 2022-12-01 RX ORDER — CEPHALEXIN 500 MG/1
500 CAPSULE ORAL EVERY 12 HOURS SCHEDULED
Qty: 10 CAPSULE | Refills: 0 | Status: SHIPPED | OUTPATIENT
Start: 2022-12-02 | End: 2022-12-07

## 2022-12-01 RX ORDER — FENTANYL CITRATE/PF 50 MCG/ML
50 SYRINGE (ML) INJECTION
Status: DISCONTINUED | OUTPATIENT
Start: 2022-12-01 | End: 2022-12-01 | Stop reason: HOSPADM

## 2022-12-01 RX ORDER — FENTANYL CITRATE 50 UG/ML
INJECTION, SOLUTION INTRAMUSCULAR; INTRAVENOUS AS NEEDED
Status: DISCONTINUED | OUTPATIENT
Start: 2022-12-01 | End: 2022-12-01

## 2022-12-01 RX ORDER — ONDANSETRON 2 MG/ML
4 INJECTION INTRAMUSCULAR; INTRAVENOUS ONCE AS NEEDED
Status: DISCONTINUED | OUTPATIENT
Start: 2022-12-01 | End: 2022-12-01 | Stop reason: HOSPADM

## 2022-12-01 RX ORDER — HYDROMORPHONE HCL/PF 1 MG/ML
0.5 SYRINGE (ML) INJECTION
Status: DISCONTINUED | OUTPATIENT
Start: 2022-12-01 | End: 2022-12-01 | Stop reason: HOSPADM

## 2022-12-01 RX ORDER — LIDOCAINE HYDROCHLORIDE 20 MG/ML
INJECTION, SOLUTION EPIDURAL; INFILTRATION; INTRACAUDAL; PERINEURAL AS NEEDED
Status: DISCONTINUED | OUTPATIENT
Start: 2022-12-01 | End: 2022-12-01

## 2022-12-01 RX ORDER — ACETAMINOPHEN 325 MG/1
650 TABLET ORAL EVERY 6 HOURS PRN
Status: DISCONTINUED | OUTPATIENT
Start: 2022-12-01 | End: 2022-12-01 | Stop reason: HOSPADM

## 2022-12-01 RX ORDER — KETOROLAC TROMETHAMINE 30 MG/ML
15 INJECTION, SOLUTION INTRAMUSCULAR; INTRAVENOUS EVERY 6 HOURS SCHEDULED
Status: DISCONTINUED | OUTPATIENT
Start: 2022-12-01 | End: 2022-12-01 | Stop reason: HOSPADM

## 2022-12-01 RX ORDER — CEFAZOLIN SODIUM 2 G/50ML
SOLUTION INTRAVENOUS AS NEEDED
Status: DISCONTINUED | OUTPATIENT
Start: 2022-12-01 | End: 2022-12-01

## 2022-12-01 RX ADMIN — ONDANSETRON 4 MG: 2 INJECTION INTRAMUSCULAR; INTRAVENOUS at 15:51

## 2022-12-01 RX ADMIN — SODIUM CHLORIDE: 0.9 INJECTION, SOLUTION INTRAVENOUS at 16:00

## 2022-12-01 RX ADMIN — PHENAZOPYRIDINE 200 MG: 200 TABLET ORAL at 18:10

## 2022-12-01 RX ADMIN — FUROSEMIDE 10 MG: 10 INJECTION, SOLUTION INTRAVENOUS at 15:53

## 2022-12-01 RX ADMIN — CEFAZOLIN SODIUM 2000 MG: 2 SOLUTION INTRAVENOUS at 15:00

## 2022-12-01 RX ADMIN — PHENYLEPHRINE HYDROCHLORIDE 30 MCG/MIN: 10 INJECTION INTRAVENOUS at 15:09

## 2022-12-01 RX ADMIN — SODIUM CHLORIDE 125 ML/HR: 0.9 INJECTION, SOLUTION INTRAVENOUS at 11:11

## 2022-12-01 RX ADMIN — PROPOFOL 50 MG: 10 INJECTION, EMULSION INTRAVENOUS at 14:57

## 2022-12-01 RX ADMIN — LIDOCAINE HYDROCHLORIDE 5 ML: 20 INJECTION, SOLUTION EPIDURAL; INFILTRATION; INTRACAUDAL; PERINEURAL at 14:56

## 2022-12-01 RX ADMIN — FENTANYL CITRATE 50 MCG: 50 INJECTION INTRAMUSCULAR; INTRAVENOUS at 15:05

## 2022-12-01 RX ADMIN — PROPOFOL 150 MG: 10 INJECTION, EMULSION INTRAVENOUS at 14:56

## 2022-12-01 NOTE — ANESTHESIA POSTPROCEDURE EVALUATION
Post-Op Assessment Note    CV Status:  Stable    Pain management: adequate     Mental Status:  Alert and awake   Hydration Status:  Euvolemic   PONV Controlled:  Controlled   Airway Patency:  Patent      Post Op Vitals Reviewed: Yes      Staff: Anesthesiologist         No notable events documented    /66   Pulse 70   Temp (!) 97 3 °F (36 3 °C)   Resp 22   Ht 5' 9" (1 753 m)   Wt 97 1 kg (214 lb 1 1 oz)   SpO2 95%   BMI 31 61 kg/m²   BP      Temp     Pulse     Resp      SpO2

## 2022-12-01 NOTE — DISCHARGE INSTRUCTIONS
Prostate Photovaporization   WHAT YOU NEED TO KNOW:   Prostate photovaporization (PVP) is a procedure that uses a laser to vaporize (burn away) part of the prostate gland  This can help reduce urinary problems caused by an enlarged prostate  DISCHARGE INSTRUCTIONS:   Medicines: You may need any of the following:  NSAIDs , such as ibuprofen, help decrease swelling, pain, and fever  This medicine is available with or without a doctor's order  NSAIDs can cause stomach bleeding or kidney problems in certain people  If you take blood thinner medicine, always ask your healthcare provider if NSAIDs are safe for you  Always read the medicine label and follow directions  Antibiotics  may help prevent a bacterial infection  Take your medicine as directed  Contact your healthcare provider if you think your medicine is not helping or if you have side effects  Tell him or her if you are allergic to any medicine  Keep a list of the medicines, vitamins, and herbs you take  Include the amounts, and when and why you take them  Bring the list or the pill bottles to follow-up visits  Carry your medicine list with you in case of an emergency  Follow up with your healthcare provider as directed:  Write down your questions so you remember to ask them during your visits  Bladder care:   Empty your bladder on a regular basis  Try to urinate every 3 hours while you are awake  Do not let your bladder become too full  Urinate as soon as you feel the need  Do not drink liquids before you go to bed  Urinate right before you go to bed  Follow instructions for catheterization  You may need to catheterize yourself if you cannot urinate on your own  Ask for more information on self-catheterization  Self-care:   Return to your usual activities as directed  You may be able to do your usual activities in 2 to 3 days  Avoid strenuous activity, such as heavy lifting, riding a bike, or running   Do not sit on mechanical equipment that vibrates, such as a lawnmower or tractor  These activities can cause you to have blood in your urine  Do not have sex for at least 2 weeks after your procedure  This will help your urinary system heal     Prevent constipation  Eat foods that are high in fiber, and drink more liquids  High-fiber foods, such as fruits, vegetables, and whole grains, will help soften your bowel movements  Regular exercise and extra liquids also help prevent constipation  Contact your healthcare provider if:   You have a fever  You have chills, a cough, or feel weak and achy  Your symptoms get worse  You are dizzy, have nausea, or are vomiting  You have questions or concerns about your condition or care  Seek care immediately or call 911 if:   You cannot urinate, or if you have a catheter, no urine is filling the bag  Your catheter comes out of your urethra  You have lower abdominal or back pain that does not go away  You have redness, pain, blood, or drainage where the catheter enters your penis  Your urine is red, cloudy, and foul smelling  © Copyright [employer] 2022 Information is for End User's use only and may not be sold, redistributed or otherwise used for commercial purposes  All illustrations and images included in CareNotes® are the copyrighted property of [employer]  or Jerel Torrez   The above information is an  only  It is not intended as medical advice for individual conditions or treatments  Talk to your doctor, nurse or pharmacist before following any medical regimen to see if it is safe and effective for you

## 2022-12-01 NOTE — DISCHARGE INSTR - AVS FIRST PAGE
Rest and drink plenty of fluids  Use Tylenol and ibuprofen for discomfort  Continue your prostate medications  Expect some blood in your urine  You may shower tomorrow  Call the office for fever, difficulty with the [place] catheter draining or difficulty voiding after the catheter has been removed

## 2022-12-01 NOTE — INTERVAL H&P NOTE
H&P reviewed  After examining the patient I find no changes in the patients condition since the H&P had been written  Vitals:    12/01/22 1057   BP: 132/85   Pulse: 66   Resp: 16   Temp: 97 8 °F (36 6 °C)   SpO2: 95%   Procedure/ risks  reviewed with patient in the holding unit

## 2022-12-01 NOTE — ANESTHESIA PREPROCEDURE EVALUATION
Procedure:  TRANSURETHRAL RESECTION OF PROSTATE W/ GREEN LIGHT LASER (Urethra)    Relevant Problems   CARDIO   (+) Combined arterial insufficiency and corporo-venous occlusive erectile dysfunction   (+) HTN (hypertension)      GI/HEPATIC   (+) GERD (gastroesophageal reflux disease)      /RENAL   (+) BPH with obstruction/lower urinary tract symptoms   (+) Prostatic disorder      PULMONARY   (+) Asthma   (+) Sleep apnea      Genitourinary   (+) Microhematuria      Other   (+) CPAP (continuous positive airway pressure) dependence   (+) Enlarged prostate   (+) Obesity        Physical Exam    Airway    Mallampati score: III  TM Distance: >3 FB  Neck ROM: full     Dental   No notable dental hx     Cardiovascular  Rhythm: regular, Rate: normal, Cardiovascular exam normal    Pulmonary  Pulmonary exam normal Breath sounds clear to auscultation,     Other Findings        Anesthesia Plan  ASA Score- 2     Anesthesia Type- general with ASA Monitors  Additional Monitors:   Airway Plan: LMA  Plan Factors-    Chart reviewed  Patient summary reviewed  Patient is not a current smoker  Patient not instructed to abstain from smoking on day of procedure  Patient did not smoke on day of surgery  There is medical exclusion for perioperative obstructive sleep apnea risk education  Induction- intravenous  Postoperative Plan-     Informed Consent- Anesthetic plan and risks discussed with patient (JAMES Rivera)

## 2022-12-01 NOTE — OP NOTE
OPERATIVE REPORT  PATIENT NAME: Staci Vargas    :  1950  MRN: [id number]  Pt Location: [facility] OR ROOM 03    SURGERY DATE: 2022    Surgeon(s) and Role:     * Ju Dawson MD - Primary    Preop Diagnosis:  BPH with obstruction/lower urinary tract symptoms [N40 1, N13 8]    Post-Op Diagnosis Codes:     * BPH with obstruction/lower urinary tract symptoms [N40 1, N13 8]    Procedure(s) (LRB):  TRANSURETHRAL RESECTION OF PROSTATE W/ GREEN LIGHT LASER (N/A)    Specimen(s):  * No specimens in log *    Estimated Blood Loss:   Minimal    Drains:  Urethral Catheter Latex 24 Fr  (Active)   Number of days: 0       Anesthesia Type:   Choice    Operative Indications:  BPH with obstruction/lower urinary tract symptoms [N40 1, N13 8]  Failure of medical therapy    Operative Findings:  Normal urethra with predominantly bilobar prostatic hypertrophy causing complete outflow obstruction  The bladder is moderately trabeculated  There are no stones, tumors or diverticula  Ureteral orifices are normal   There is obstructing anterior tissue as well  Uneventful photoselective vaporization the prostate with the high-powered GreenLight laser  Complications:   None    Procedure and Technique:      Procedure and Technique:   The patient was brought to the operating room and identified as Staci Vargas  General anesthesia was administered and the patient was placed in Lithotomy position  The patient was prepped and draped in the usual sterile fashion  Compression boots were employed  IV antibiotics administered and time out performed  Cystoscopy was performed with the 23 [place] continuous flow cystoscope  Findings are as above  Ureteral orifices and veru montanum were identified  The greenlight laser fiber was placed through the scope  Vaporization began on the bladder neck at 80 crain power  The bladder neck was vaporized circumferentially  Right and left lateral lobes were vaporized to level of capsular fibers   Power was raised to 180 w to ensure adequate vaporization  Anterior and apical tissue were lasered at lower energy  No vaporization was performed distal to the veru  When vaporization was complete hemostasis was confirmed and any bleeders cauterized  The ureteral orifices were identified and unaffected by laser energy  The bladder was left full and cystoscope removed  A 24 F mckeon catheter was placed and balloon filled to 40 cc  The catheter irrigated well and returned light pink to clear urine  The mckeon was placed to straight drainage  The patient was awakened from anesthesia  He tolerated the procedure well and was taken to the recovery room in satisfactory condition         I was present for the entire procedure    Patient Disposition:  PACU         SIGNATURE: Mary George MD  DATE: December 1, 2022  TIME: 4:02 PM

## 2022-12-02 ENCOUNTER — TELEPHONE (OUTPATIENT)
Dept: UROLOGY | Facility: MEDICAL CENTER | Age: 72
End: 2022-12-02

## 2022-12-02 NOTE — TELEPHONE ENCOUNTER
Post Op Note    Aguila Winston is a 67 y o  male s/p TRANSURETHRAL RESECTION OF PROSTATE W/ GREEN LIGHT LASER performed 12/1/2022  gAuila Winston is a patient of Dr Jhonny Arias and is seen at the [facility] office  How would you rate your pain on a scale from 1 to 10, 10 being the worst pain ever? 0  Have you had a fever? No  Have your bowel movements been regular? Yes  Do you have any difficulty urinating? Yes  If the patient has a mckeon- are you comfortable caring for your mckeon? Yes Is it draining urine? Yes  Do you have any other questions or concerns that I can address at this time? No      I spoke with pt to see how he was doing after his procedure yesterday  He has no issues or concerns at this time  Mckeon removal scheduled for Monday 12/5/22  ER precautions reviewed with the patient  Encouraged hydration  Post op is scheduled as well

## 2022-12-05 ENCOUNTER — PROCEDURE VISIT (OUTPATIENT)
Dept: UROLOGY | Facility: MEDICAL CENTER | Age: 72
End: 2022-12-05

## 2022-12-05 ENCOUNTER — TELEPHONE (OUTPATIENT)
Dept: UROLOGY | Facility: MEDICAL CENTER | Age: 72
End: 2022-12-05

## 2022-12-05 VITALS
SYSTOLIC BLOOD PRESSURE: 142 MMHG | WEIGHT: 218 LBS | HEART RATE: 103 BPM | DIASTOLIC BLOOD PRESSURE: 80 MMHG | BODY MASS INDEX: 32.29 KG/M2 | HEIGHT: 69 IN

## 2022-12-05 DIAGNOSIS — N13.8 BPH WITH OBSTRUCTION/LOWER URINARY TRACT SYMPTOMS: Primary | ICD-10-CM

## 2022-12-05 DIAGNOSIS — N40.1 BPH WITH OBSTRUCTION/LOWER URINARY TRACT SYMPTOMS: Primary | ICD-10-CM

## 2022-12-05 NOTE — TELEPHONE ENCOUNTER
I spoke with patient and provided information per Aimee Floyd:  Guerline Kaba MD  You 1 hour ago (1:47 PM)     It is okay for this to be a teaching visit  Raymundomary ellen Jordan would not want him to be sexually active for another few weeks post the laser treatment       Pt understood

## 2022-12-05 NOTE — PROGRESS NOTES
12/5/2022    [facility]  1950  [id number]    Diagnosis      Patient presents for mckeon removal managed by Dr Preeti Galo  Return to office for post op follow up     Procedure Mckeon removal/voiding trial    Mckeon catheter removed after deflation of an intact balloon  Patient tolerated well  Encouraged patient to hydrate well and return this afternoon for post void residual   he knows he may return early if uncomfortable and unable to urinate  Patient agrees to this plan  I spoke with patient to see how he was doing since mckeon removal this morning  Patient stated he has urinated a lot since he left and has a good stream  He is not coming back this afternoon for PVR  No results found for this or any previous visit (from the past 4 hour(s))          Vitals:    12/05/22 0840   BP: 142/80   BP Location: Left arm   Patient Position: Sitting   Cuff Size: Standard   Pulse: 103   Weight: 98 9 kg (218 lb)   Height: 5' 9" (1 753 m)           Darian Sampson RN

## 2022-12-05 NOTE — TELEPHONE ENCOUNTER
Patient wants to know if he can still come in for his injection teaching its scheduled for 12/23/22

## 2022-12-23 ENCOUNTER — OFFICE VISIT (OUTPATIENT)
Dept: UROLOGY | Facility: MEDICAL CENTER | Age: 72
End: 2022-12-23

## 2022-12-23 VITALS
HEART RATE: 87 BPM | HEIGHT: 69 IN | DIASTOLIC BLOOD PRESSURE: 90 MMHG | BODY MASS INDEX: 32.29 KG/M2 | SYSTOLIC BLOOD PRESSURE: 140 MMHG | WEIGHT: 218 LBS

## 2022-12-23 DIAGNOSIS — N52.9 ERECTILE DYSFUNCTION, UNSPECIFIED ERECTILE DYSFUNCTION TYPE: Primary | ICD-10-CM

## 2022-12-23 NOTE — PROGRESS NOTES
Trimix teaching  12/23/2022    Assessment and Plan    67 y o  male managed by Dr Pooja Camacho    1  Erectile Dysfunction    Patient was provided verbal and physical demonstration of Trimix use  He was instructed on storage, disposal, and titration of the medication  He was educated on hospital precautions for priapism  He was able to demonstrate understanding of injection and injected 0 1cc of the medication today in the office  He tolerated the procedure well  Depending on response, will titrate as necessary and follow up in 1 year  All questions and concerns have been addressed and answered  Chief Complaint   Patient presents with   • Erectile Dysfunction         History of Present Illness  Tae Walker is a 67 y o  male here for trimix injection teaching  He has a history of erectile dysfuntion and has failed PDE5 inhibitors  Review of Systems   Constitutional: Negative for chills and fever  Respiratory: Negative for cough and shortness of breath  Cardiovascular: Negative for chest pain  Gastrointestinal: Negative for abdominal distention, abdominal pain, blood in stool, nausea and vomiting  Genitourinary: Negative for difficulty urinating, dysuria, enuresis, flank pain, frequency, hematuria and urgency  Skin: Negative for rash           Past Medical History  Past Medical History:   Diagnosis Date   • Arthritis    • BPH with obstruction/lower urinary tract symptoms    • CPAP (continuous positive airway pressure) dependence    • Enlarged prostate    • Erectile dysfunction    • Feeling of incomplete bladder emptying    • GERD (gastroesophageal reflux disease)    • Microscopic hematuria    • Poor urinary stream    • Shortness of breath    • Sleep apnea    • Urgency of urination        Past Social History  Past Surgical History:   Procedure Laterality Date   • COLONOSCOPY     • JOINT REPLACEMENT Bilateral     knees   • GA BIOPSY OF PROSTATE,NEEDLE/PUNCH N/A 10/13/2020    Procedure: TRANSPERINEAL NEEDLE BIOPSY PROSTATE (TRNBP); Surgeon: Antonette Hernandez MD;  Location: [facility];  Service: Urology   • NE LASER [address] [facility], COMPLETE N/A 12/1/2022    Procedure: TRANSURETHRAL RESECTION OF PROSTATE W/ GREEN LIGHT LASER;  Surgeon: Radha Carrion MD;  Location: [facility];  Service: Urology       Past Family History  No family history on file  Past Social history  Social History     Socioeconomic History   • Marital status:      Spouse name: Not on file   • Number of children: Not on file   • Years of education: Not on file   • Highest education level: Not on file   Occupational History   • Not on file   Tobacco Use   • Smoking status: Former   • Smokeless tobacco: Never   Vaping Use   • Vaping Use: Never used   Substance and Sexual Activity   • Alcohol use:  Yes     Alcohol/week: 2 0 standard drinks     Types: 2 Cans of beer per week     Comment: rare   • Drug use: No   • Sexual activity: Not on file   Other Topics Concern   • Not on file   Social History Narrative   • Not on file     Social Determinants of Health     Financial Resource Strain: Not on file   Food Insecurity: Not on file   Transportation Needs: Not on file   Physical Activity: Not on file   Stress: Not on file   Social Connections: Not on file   Intimate Partner Violence: Not on file   Housing Stability: Not on file       Current Medications  Current Outpatient Medications   Medication Sig Dispense Refill   • Alprostadil, Vasodilator, (PROSTAGLANDIN PGE1 INJECTABLE 20 MCG/ML, STANDARD, IJ SOLN) 0 5 mL by Intracavernosal route 3 (three) times a week As needed for erectile dysfunction 10 mL 0   • finasteride (PROSCAR) 5 mg tablet Take 1 tablet (5 mg total) by mouth daily 90 tablet 3   • latanoprost (XALATAN) 0 005 % ophthalmic solution USE 1 DROP INTO BOTH EYES AT BEDTIME  5   • metoprolol tartrate (LOPRESSOR) 50 mg tablet Take 25 mg by mouth daily after dinner     • Multiple Vitamin (MULTI-VITAMIN DAILY) TABS Take by mouth     • phenazopyridine (PYRIDIUM) 200 mg tablet Take 1 tablet (200 mg total) by mouth 3 (three) times a day as needed (Bladder discomfort and burning on urination  Take with meals  ) 10 tablet 0   • Saw [place] 1000 MG CAPS Take by mouth     • tamsulosin (FLOMAX) 0 4 mg TAKE 1 CAPSULE BY MOUTH TWICE A  capsule 4   • amLODIPine (NORVASC) 2 5 mg tablet Take 2 5 mg by mouth daily morning       No current facility-administered medications for this visit  Allergies  No Known Allergies      Past Medical History, Social History, Family History, medications and allergies were reviewed  Vitals  Vitals:    12/23/22 1123   BP: 140/90   BP Location: Left arm   Patient Position: Sitting   Pulse: 87   Weight: 98 9 kg (218 lb)   Height: 5' 9" (1 753 m)         Physical Exam    Constitutional   General appearance: Patient is seated and in no acute distress, well appearing and well nourished  Head and Face   Head and face: Normal     Eyes   Conjunctiva and lids: No erythema, swelling or discharge  Ears, Nose, Mouth, and Throat   Hearing: Normal     Pulmonary   Respiratory effort: No increased work of breathing or signs of respiratory distress  Cardiovascular   Examination of extremities for edema and/or varicosities: Normal     Abdomen   Abdomen: Non-tender, no masses  Genitourinary  Penis circumcised, phallus normal, meatus patent  Testicles descended into scrotum bilaterally without masses nor tenderness  Musculoskeletal   Gait and station:     Skin   Skin and subcutaneous tissue: Warm, dry, and intact  No visible lesions or rashes  Psychiatric   Judgment and insight: Normal  Recent and remote memory:  Normal  Mood and affect: Normal    Procedure: intracavernosal injection  Indication: Erectile Rehabilitation  unknown cause  Discussed:  Proper technique and instruction for intracavernosal injection were explained to the patient   risks of injection including but not limited to pain, bleeding, infection, fibrosis, and priapism (including the potential complications of priapism) were discussed  Procedure: The skin overlying the injection site was then prepped with Alcohol  0 1 ml of was injected into the corpora cavernosum  Patient Status:  the patient was closely monitored for 10 minutes and reassessed  He tolerated the procedure well  Response to intracavernosal injection was good   Complications:  No complications noted  Instructions:  the patient was counseled about priapism and instructed to return to the clinic or the emergency room if his erection lasted up to 4 hours  the patient expressed understanding of the injection technique and felt able to perform self-injection       ELVIA Flores

## 2023-01-04 ENCOUNTER — OFFICE VISIT (OUTPATIENT)
Dept: UROLOGY | Facility: MEDICAL CENTER | Age: 73
End: 2023-01-04

## 2023-01-04 VITALS
DIASTOLIC BLOOD PRESSURE: 86 MMHG | SYSTOLIC BLOOD PRESSURE: 130 MMHG | WEIGHT: 218 LBS | BODY MASS INDEX: 32.29 KG/M2 | HEIGHT: 69 IN | HEART RATE: 96 BPM

## 2023-01-04 DIAGNOSIS — N40.1 BPH WITH OBSTRUCTION/LOWER URINARY TRACT SYMPTOMS: Primary | ICD-10-CM

## 2023-01-04 DIAGNOSIS — N13.8 BPH WITH OBSTRUCTION/LOWER URINARY TRACT SYMPTOMS: Primary | ICD-10-CM

## 2023-01-04 LAB — POST-VOID RESIDUAL VOLUME, ML POC: 7 ML

## 2023-01-04 NOTE — PROGRESS NOTES
1/4/2023      Chief Complaint   Patient presents with   • Benign Prostatic Hypertrophy         Assessment and Plan    68 y o  male managed by Dr Jacob Forrest     1  BPH with obstruction  · S/p GLL on 12/1/22  · AUA score: 9   · PVR: 7 mL   · Will discontinue Flomax and continue finasteride for now  If he continues to be doing well at his follow up visit, will discuss discontinuing finasteride as well  · Follow up in 8 weeks for 3 month postop visit  History of Present Illness  Jamal Marcano is a 68 y o  male here for postop evaluation s/p GLL on 12/1/22  His mckeon catheter was removed in the office on 12/5/22  He underwent Trimix teaching on 12/23/22 but understood he was to refrain for sexual activity for a few weeks yet  He presents today and notes mild dysuria but improvement in both his frequency and urgency  He also notes stronger stream  His nocturia has improved from 2-3x per night to 1-2x per night  He denies any hematuria, fevers, chills, or pain  He is agreeable to plan above  Review of Systems   Constitutional: Negative for chills and fever  HENT: Negative  Respiratory: Negative  Cardiovascular: Negative  Gastrointestinal: Negative for abdominal pain, nausea and vomiting  Genitourinary: Positive for dysuria (mild)  Negative for difficulty urinating, frequency (improved), hematuria, scrotal swelling, testicular pain and urgency (improved)  Notes a stronger stream  Nocturia 1-2x per night, improved from 2-3x per night   Musculoskeletal: Negative  Skin: Negative  Neurological: Negative  AUA SYMPTOM SCORE    Flowsheet Row Most Recent Value   AUA SYMPTOM SCORE    How often have you had a sensation of not emptying your bladder completely after you finished urinating? 1   How often have you had to urinate again less than two hours after you finished urinating? 2   How often have you found you stopped and started again several times when you urinate?  2   How often have you found it difficult to postpone urination? 1   How often have you had a weak urinary stream? 1   How often have you had to push or strain to begin urination? 1   How many times did you most typically get up to urinate from the time you went to bed at night until the time you got up in the morning? 1   Quality of Life: If you were to spend the rest of your life with your urinary condition just the way it is now, how would you feel about that? 2   AUA SYMPTOM SCORE 9           Vitals  Vitals:    01/04/23 0927   BP: 130/86   BP Location: Left arm   Patient Position: Sitting   Cuff Size: Large   Pulse: 96   Weight: 98 9 kg (218 lb)   Height: 5' 9" (1 753 m)       Physical Exam  Vitals reviewed  Constitutional:       General: He is not in acute distress  Appearance: Normal appearance  He is obese  He is not ill-appearing, toxic-appearing or diaphoretic  HENT:      Head: Normocephalic and atraumatic  Eyes:      Conjunctiva/sclera: Conjunctivae normal    Pulmonary:      Effort: Pulmonary effort is normal  No respiratory distress  Abdominal:      General: There is no distension  Palpations: Abdomen is soft  Tenderness: There is no abdominal tenderness  There is no right CVA tenderness, left CVA tenderness, guarding or rebound  Musculoskeletal:         General: Normal range of motion  Cervical back: Normal range of motion  Skin:     General: Skin is warm and dry  Neurological:      General: No focal deficit present  Mental Status: He is alert and oriented to person, place, and time  Psychiatric:         Mood and Affect: Mood normal          Behavior: Behavior normal          Thought Content:  Thought content normal          Judgment: Judgment normal            Past History  Past Medical History:   Diagnosis Date   • Arthritis    • BPH with obstruction/lower urinary tract symptoms    • CPAP (continuous positive airway pressure) dependence    • Enlarged prostate    • Erectile dysfunction • Feeling of incomplete bladder emptying    • GERD (gastroesophageal reflux disease)    • Microscopic hematuria    • Poor urinary stream    • Shortness of breath    • Sleep apnea    • Urgency of urination      Social History     Socioeconomic History   • Marital status:      Spouse name: None   • Number of children: None   • Years of education: None   • Highest education level: None   Occupational History   • None   Tobacco Use   • Smoking status: Former   • Smokeless tobacco: Never   Vaping Use   • Vaping Use: Never used   Substance and Sexual Activity   • Alcohol use: Yes     Alcohol/week: 2 0 standard drinks     Types: 2 Cans of beer per week     Comment: rare   • Drug use: No   • Sexual activity: None   Other Topics Concern   • None   Social History Narrative   • None     Social Determinants of Health     Financial Resource Strain: Not on file   Food Insecurity: Not on file   Transportation Needs: Not on file   Physical Activity: Not on file   Stress: Not on file   Social Connections: Not on file   Intimate Partner Violence: Not on file   Housing Stability: Not on file     Social History     Tobacco Use   Smoking Status Former   Smokeless Tobacco Never     No family history on file      The following portions of the patient's history were reviewed and updated as appropriate: allergies, current medications, past medical history, past social history, past surgical history and problem list     Results  Recent Results (from the past 1 hour(s))   POCT Measure PVR    Collection Time: 01/04/23  9:37 AM   Result Value Ref Range    POST-VOID RESIDUAL VOLUME, ML POC 7 mL   ]  Lab Results   Component Value Date    PSA 2 4 11/21/2022    PSA 3 8 04/27/2022    PSA 3 7 10/25/2021    PSA 3 8 04/17/2021     Lab Results   Component Value Date    CALCIUM 8 9 11/21/2022    K 4 1 11/21/2022    CO2 27 11/21/2022     (H) 11/21/2022    BUN 17 11/21/2022    CREATININE 0 69 11/21/2022     Lab Results   Component Value Date WBC 6 46 11/21/2022    HGB 15 7 11/21/2022    HCT 45 7 11/21/2022    MCV 95 11/21/2022     11/21/2022     Miriam Marrero PA-C

## 2023-03-13 ENCOUNTER — OFFICE VISIT (OUTPATIENT)
Dept: UROLOGY | Facility: MEDICAL CENTER | Age: 73
End: 2023-03-13

## 2023-03-13 VITALS
BODY MASS INDEX: 31.55 KG/M2 | HEIGHT: 69 IN | WEIGHT: 213 LBS | SYSTOLIC BLOOD PRESSURE: 120 MMHG | DIASTOLIC BLOOD PRESSURE: 70 MMHG | HEART RATE: 83 BPM

## 2023-03-13 DIAGNOSIS — N13.8 BPH WITH OBSTRUCTION/LOWER URINARY TRACT SYMPTOMS: Primary | ICD-10-CM

## 2023-03-13 DIAGNOSIS — Z12.5 PROSTATE CANCER SCREENING: ICD-10-CM

## 2023-03-13 DIAGNOSIS — N40.1 BPH WITH OBSTRUCTION/LOWER URINARY TRACT SYMPTOMS: Primary | ICD-10-CM

## 2023-03-13 LAB — POST-VOID RESIDUAL VOLUME, ML POC: 13 ML

## 2023-03-13 RX ORDER — APIXABAN 5 MG (74)
KIT ORAL
COMMUNITY
Start: 2023-03-09

## 2023-03-13 RX ORDER — AMOXICILLIN 500 MG/1
CAPSULE ORAL
COMMUNITY
Start: 2023-03-06

## 2023-03-13 RX ORDER — ALBUTEROL SULFATE 90 UG/1
AEROSOL, METERED RESPIRATORY (INHALATION)
COMMUNITY
Start: 2023-03-07

## 2023-03-13 NOTE — PROGRESS NOTES
3/13/2023      Chief Complaint   Patient presents with   • Follow-up     S/p GLL         Assessment and Plan    68 y o  male managed by Dr Louise Barksdale     1  BPH with obstruction  • S/p GLL on 12/1/22  • AUA score: 10   • Doing well off Flomax  Will discontinue finasteride as well  • Discussed bladder irritants and importance of daily water intake of 40-60 oz  • Follow up in November 2023 to resume annual visits  2  ED  · S/p Trimix teaching 12/5/22  Has not had improvement on 20/1/30 even with injection 1 0 mL  Dose increased to 40/1/30  Script sent    3  Prostate cancer screening  · PSA due Nov 2023  · Previous PSAs: 2 4/4 8 (11/21/22), 3 8/7 6 (4/27/22), 3 7/7 4 (10/25/21), 3 8/7 6 (4/17/21)  · Corrected for finasteride use  History of Present Illness  Tamiko Langston is a 68 y o  male here for follow up evaluation s/p GLL on 12/1/22  Patient was doing well at his first initial postop appointment on 1-4-2023  Flomax was discontinued from his regimen and he was to continue finasteride  He has been doing well overall  Notes frequency Q2 hours but drinks OJ, coffee, and soda  Stream has much improved  Nocturia 1-2x per night, usually soda before bed  No dysuria or gross hematuria  No fevers or chills  He does note that his Trimix has not being working  Also notes that his sexual arousal is often missing prior to attempting sexual intercourse  Review of Systems   Constitutional: Negative for chills and fever  HENT: Negative  Respiratory: Negative  Cardiovascular: Negative  Gastrointestinal: Negative for abdominal pain, nausea and vomiting  Genitourinary: Positive for frequency (persists Q2 hours)  Negative for difficulty urinating, dysuria, flank pain, hematuria and urgency (improved)  Continues to have improved stream  Nocturia 2x per night, sometimes once  Nocturia was 2-3x per night prior to surgery   Musculoskeletal: Negative  Skin: Negative  Neurological: Negative        AUA SYMPTOM SCORE    Flowsheet Row Most Recent Value   AUA SYMPTOM SCORE    How often have you had a sensation of not emptying your bladder completely after you finished urinating? 1   How often have you had to urinate again less than two hours after you finished urinating? 3   How often have you found you stopped and started again several times when you urinate? 1   How often have you found it difficult to postpone urination? 2   How often have you had a weak urinary stream? 1   How often have you had to push or strain to begin urination? 0   How many times did you most typically get up to urinate from the time you went to bed at night until the time you got up in the morning? 2   Quality of Life: If you were to spend the rest of your life with your urinary condition just the way it is now, how would you feel about that? 3   AUA SYMPTOM SCORE 10           Vitals  Vitals:    03/13/23 0918   BP: 120/70   Pulse: 83   Weight: 96 6 kg (213 lb)   Height: 5' 9" (1 753 m)       Physical Exam  Vitals reviewed  Constitutional:       General: He is not in acute distress  Appearance: Normal appearance  He is obese  He is not ill-appearing, toxic-appearing or diaphoretic  HENT:      Head: Normocephalic and atraumatic  Eyes:      Conjunctiva/sclera: Conjunctivae normal    Pulmonary:      Effort: Pulmonary effort is normal  No respiratory distress  Abdominal:      General: There is no distension  Palpations: Abdomen is soft  Tenderness: There is no abdominal tenderness  There is no right CVA tenderness, left CVA tenderness, guarding or rebound  Musculoskeletal:         General: Normal range of motion  Cervical back: Normal range of motion  Skin:     General: Skin is warm and dry  Neurological:      General: No focal deficit present  Mental Status: He is alert and oriented to person, place, and time     Psychiatric:         Mood and Affect: Mood normal          Behavior: Behavior normal  Thought Content: Thought content normal          Judgment: Judgment normal        Past History  Past Medical History:   Diagnosis Date   • Arthritis    • BPH with obstruction/lower urinary tract symptoms    • CPAP (continuous positive airway pressure) dependence    • Enlarged prostate    • Erectile dysfunction    • Feeling of incomplete bladder emptying    • GERD (gastroesophageal reflux disease)    • Microscopic hematuria    • Poor urinary stream    • Shortness of breath    • Sleep apnea    • Urgency of urination      Social History     Socioeconomic History   • Marital status:      Spouse name: None   • Number of children: None   • Years of education: None   • Highest education level: None   Occupational History   • None   Tobacco Use   • Smoking status: Former   • Smokeless tobacco: Never   Vaping Use   • Vaping Use: Never used   Substance and Sexual Activity   • Alcohol use: Yes     Alcohol/week: 2 0 standard drinks     Types: 2 Cans of beer per week     Comment: rare   • Drug use: No   • Sexual activity: None   Other Topics Concern   • None   Social History Narrative   • None     Social Determinants of Health     Financial Resource Strain: Not on file   Food Insecurity: Not on file   Transportation Needs: Not on file   Physical Activity: Not on file   Stress: Not on file   Social Connections: Not on file   Intimate Partner Violence: Not on file   Housing Stability: Not on file     Social History     Tobacco Use   Smoking Status Former   Smokeless Tobacco Never     History reviewed  No pertinent family history      The following portions of the patient's history were reviewed and updated as appropriate: allergies, current medications, past medical history, past social history, past surgical history and problem list     Results  Recent Results (from the past 1 hour(s))   POCT Measure PVR    Collection Time: 03/13/23  9:25 AM   Result Value Ref Range    POST-VOID RESIDUAL VOLUME, ML POC 13 mL   ]  Lab Results   Component Value Date    PSA 2 4 11/21/2022    PSA 3 8 04/27/2022    PSA 3 7 10/25/2021    PSA 3 8 04/17/2021     Lab Results   Component Value Date    CALCIUM 8 9 11/21/2022    K 4 1 11/21/2022    CO2 27 11/21/2022     (H) 11/21/2022    BUN 17 11/21/2022    CREATININE 0 69 11/21/2022     Lab Results   Component Value Date    WBC 6 46 11/21/2022    HGB 15 7 11/21/2022    HCT 45 7 11/21/2022    MCV 95 11/21/2022     11/21/2022     Moises Guaman PA-C

## 2023-03-13 NOTE — PATIENT INSTRUCTIONS
Can stop finasteride now as well  Make sure to drink 40-60 oz of water  OJ and soda will make symptoms worse  Increased dose of Trimix  Follow up in Nov 2023 with PSA prior   (No ejaculation within 3 days of testing)

## 2023-06-28 DIAGNOSIS — N40.1 BPH WITH OBSTRUCTION/LOWER URINARY TRACT SYMPTOMS: ICD-10-CM

## 2023-06-28 DIAGNOSIS — N13.8 BPH WITH OBSTRUCTION/LOWER URINARY TRACT SYMPTOMS: ICD-10-CM

## 2023-06-28 RX ORDER — TAMSULOSIN HYDROCHLORIDE 0.4 MG/1
CAPSULE ORAL
Qty: 180 CAPSULE | Refills: 4 | Status: SHIPPED | OUTPATIENT
Start: 2023-06-28

## 2023-11-24 ENCOUNTER — TELEPHONE (OUTPATIENT)
Dept: UROLOGY | Facility: MEDICAL CENTER | Age: 73
End: 2023-11-24

## 2023-11-24 NOTE — TELEPHONE ENCOUNTER
Call Placed: LVM as per communication consent on phone number [phone]. Informed Pt to have labs done prior to OV on 12/1/23 with Simin Torres. Advised Pt if he has any questions he can call office back at [phone].

## 2023-11-24 NOTE — TELEPHONE ENCOUNTER
Patient called to reschedule his 12/1/23 appointment with Damian Avelar. He will be away from this Wednesday until 12/1/23 in the evening and then goes away on a cruise from 1/6/24 to 1/23/24.  Would someone please put him in with Damian Avelar sometime after 12/1/23 but before 1/6/24    CB: [phone]

## 2023-12-04 ENCOUNTER — APPOINTMENT (OUTPATIENT)
Dept: LAB | Facility: CLINIC | Age: 73
End: 2023-12-04
Payer: COMMERCIAL

## 2023-12-04 LAB — PSA SERPL-MCNC: 1.77 NG/ML (ref 0–4)

## 2023-12-07 ENCOUNTER — OFFICE VISIT (OUTPATIENT)
Dept: UROLOGY | Facility: MEDICAL CENTER | Age: 73
End: 2023-12-07
Payer: COMMERCIAL

## 2023-12-07 VITALS
BODY MASS INDEX: 31.55 KG/M2 | DIASTOLIC BLOOD PRESSURE: 78 MMHG | WEIGHT: 213 LBS | HEIGHT: 69 IN | OXYGEN SATURATION: 96 % | SYSTOLIC BLOOD PRESSURE: 118 MMHG | HEART RATE: 98 BPM

## 2023-12-07 DIAGNOSIS — N40.1 BPH WITH OBSTRUCTION/LOWER URINARY TRACT SYMPTOMS: Primary | ICD-10-CM

## 2023-12-07 DIAGNOSIS — Z12.5 PROSTATE CANCER SCREENING: ICD-10-CM

## 2023-12-07 DIAGNOSIS — N13.8 BPH WITH OBSTRUCTION/LOWER URINARY TRACT SYMPTOMS: Primary | ICD-10-CM

## 2023-12-07 DIAGNOSIS — R31.29 MICROHEMATURIA: ICD-10-CM

## 2023-12-07 LAB
SL AMB  POCT GLUCOSE, UA: ABNORMAL
SL AMB LEUKOCYTE ESTERASE,UA: ABNORMAL
SL AMB POCT BILIRUBIN,UA: ABNORMAL
SL AMB POCT BLOOD,UA: ABNORMAL
SL AMB POCT CLARITY,UA: CLEAR
SL AMB POCT COLOR,UA: YELLOW
SL AMB POCT KETONES,UA: ABNORMAL
SL AMB POCT NITRITE,UA: ABNORMAL
SL AMB POCT PH,UA: 6.5
SL AMB POCT SPECIFIC GRAVITY,UA: 1.02
SL AMB POCT URINE PROTEIN: ABNORMAL
SL AMB POCT UROBILINOGEN: 0.2

## 2023-12-07 PROCEDURE — 81003 URINALYSIS AUTO W/O SCOPE: CPT | Performed by: PHYSICIAN ASSISTANT

## 2023-12-07 PROCEDURE — 99214 OFFICE O/P EST MOD 30 MIN: CPT | Performed by: PHYSICIAN ASSISTANT

## 2023-12-07 RX ORDER — ATORVASTATIN CALCIUM 20 MG/1
20 TABLET, FILM COATED ORAL DAILY
COMMUNITY

## 2023-12-07 RX ORDER — OMEPRAZOLE 20 MG/1
20 CAPSULE, DELAYED RELEASE ORAL DAILY
COMMUNITY
Start: 2023-11-24

## 2023-12-07 NOTE — PROGRESS NOTES
12/7/2023      Chief Complaint   Patient presents with    Benign Prostatic Hypertrophy         Assessment and Plan    68 y.o. male managed by Dr Andry Macias    1. BPH-happy with results of greenlight laser TURP last year, no meds  2. Erectile dysfunction-Trimix 100/3/30 dose sent to [facility], spent a good deal of time today discussing three-part inflatable penile prosthesis. I provided him with other written information he will discuss this with his girlfriend and schedule consultation with Dr. Nakia Arango or Dr Yuli Reyes if/when he is ready to hear more. 3.  Prostate cancer screening-PSA 1.7, KENZIE smooth no nodule    Will call when he is ready formal IPP consult if desired, otherwise 1 year visit    Lab Results   Component Value Date    PSA 1.77 12/04/2023    PSA 2.4 11/21/2022    PSA 3.8 04/27/2022         History of Present Illness  Lexy Welch is a 68 y.o. male here for evaluation of BPH status post greenlight laser Dec 2022 (1 year ago) now off all medications. Erectile dysfunction utilizing super-Trimix 40/1/30 up to 1.0ml without effect. He wishes to try the next higher dose and hear about any other options. He and his girlfriend are active about 1x per month but feel they would be more if he had better function. Updated prostate cancer screening today. Review of Systems   Constitutional: Negative. Respiratory: Negative. Cardiovascular: Negative. Genitourinary:  Negative for decreased urine volume, difficulty urinating, dysuria, flank pain, frequency, hematuria, testicular pain and urgency. Musculoskeletal: Negative. Vitals  Vitals:    12/07/23 0937   BP: 118/78   Pulse: 98   SpO2: 96%   Weight: 96.6 kg (213 lb)   Height: 5' 9" (1.753 m)       Physical Exam  Vitals and nursing note reviewed. Constitutional:       General: He is not in acute distress. Appearance: Normal appearance. He is well-developed. He is not diaphoretic.    HENT:      Head: Normocephalic and atraumatic. Pulmonary:      Effort: Pulmonary effort is normal.      Comments: No cough or audible wheeze  Abdominal:      General: There is no distension. Tenderness: There is no abdominal tenderness. There is no right CVA tenderness or left CVA tenderness. Genitourinary:     Comments: Circumcised penis, normal phallus, orthotopic patent meatus. Testes smooth descended bilaterally into the scrotum nontender with no palpable mass. Digital rectal exam smooth prostate, without appreciable nodule, induration or asymmetry  Musculoskeletal:      Right lower leg: No edema. Left lower leg: No edema. Skin:     General: Skin is warm and dry. Neurological:      Mental Status: He is alert and oriented to person, place, and time. Gait: Gait normal.   Psychiatric:         Speech: Speech normal.         Behavior: Behavior normal.           Past History  Past Medical History:   Diagnosis Date    Arthritis     BPH with obstruction/lower urinary tract symptoms     CPAP (continuous positive airway pressure) dependence     Enlarged prostate     Erectile dysfunction     Feeling of incomplete bladder emptying     GERD (gastroesophageal reflux disease)     Microscopic hematuria     Poor urinary stream     Shortness of breath     Sleep apnea     Urgency of urination      Social History     Socioeconomic History    Marital status:      Spouse name: Not on file    Number of children: Not on file    Years of education: Not on file    Highest education level: Not on file   Occupational History    Not on file   Tobacco Use    Smoking status: Former    Smokeless tobacco: Never   Vaping Use    Vaping Use: Never used   Substance and Sexual Activity    Alcohol use:  Yes     Alcohol/week: 2.0 standard drinks of alcohol     Types: 2 Cans of beer per week     Comment: rare    Drug use: No    Sexual activity: Not on file   Other Topics Concern    Not on file   Social History Narrative    Not on file     Social Determinants of Health     Financial Resource Strain: Not on file   Food Insecurity: Not on file   Transportation Needs: Not on file   Physical Activity: Not on file   Stress: Not on file   Social Connections: Not on file   Intimate Partner Violence: Not on file   Housing Stability: Not on file     Social History     Tobacco Use   Smoking Status Former   Smokeless Tobacco Never     No family history on file.     The following portions of the patient's history were reviewed and updated as appropriate: allergies, current medications, past medical history, past social history, past surgical history and problem list.    Results  Recent Results (from the past 1 hour(s))   POCT urine dip auto non-scope    Collection Time: 12/07/23  9:42 AM   Result Value Ref Range     COLOR,UA yellow     CLARITY,UA clear     SPECIFIC GRAVITY,UA 1.020      PH,UA 6.5     LEUKOCYTE ESTERASE,UA neg     NITRITE,UA neg     GLUCOSE, UA neg     KETONES,UA neg     BILIRUBIN,UA neg     BLOOD,UA moderate     POCT URINE PROTEIN neg     SL AMB POCT UROBILINOGEN 0.2    ]  Lab Results   Component Value Date    PSA 1.77 12/04/2023    PSA 2.4 11/21/2022    PSA 3.8 04/27/2022    PSA 3.7 10/25/2021     Lab Results   Component Value Date    CALCIUM 8.9 11/21/2022    K 4.1 11/21/2022    CO2 27 11/21/2022     (H) 11/21/2022    BUN 17 11/21/2022    CREATININE 0.69 11/21/2022     Lab Results   Component Value Date    WBC 6.46 11/21/2022    HGB 15.7 11/21/2022    HCT 45.7 11/21/2022    MCV 95 11/21/2022     11/21/2022

## 2024-04-11 ENCOUNTER — TELEPHONE (OUTPATIENT)
Dept: UROLOGY | Facility: MEDICAL CENTER | Age: 74
End: 2024-04-11

## 2024-04-16 ENCOUNTER — OFFICE VISIT (OUTPATIENT)
Dept: UROLOGY | Facility: MEDICAL CENTER | Age: 74
End: 2024-04-16
Payer: COMMERCIAL

## 2024-04-16 VITALS — HEART RATE: 91 BPM | HEIGHT: 69 IN | OXYGEN SATURATION: 96 % | WEIGHT: 214 LBS | BODY MASS INDEX: 31.7 KG/M2

## 2024-04-16 DIAGNOSIS — N13.8 BPH WITH OBSTRUCTION/LOWER URINARY TRACT SYMPTOMS: Primary | ICD-10-CM

## 2024-04-16 DIAGNOSIS — N40.1 BPH WITH OBSTRUCTION/LOWER URINARY TRACT SYMPTOMS: Primary | ICD-10-CM

## 2024-04-16 DIAGNOSIS — N52.9 ERECTILE DYSFUNCTION, UNSPECIFIED ERECTILE DYSFUNCTION TYPE: ICD-10-CM

## 2024-04-16 LAB
SL AMB  POCT GLUCOSE, UA: ABNORMAL
SL AMB LEUKOCYTE ESTERASE,UA: ABNORMAL
SL AMB POCT BILIRUBIN,UA: ABNORMAL
SL AMB POCT BLOOD,UA: ABNORMAL
SL AMB POCT CLARITY,UA: CLEAR
SL AMB POCT COLOR,UA: YELLOW
SL AMB POCT KETONES,UA: ABNORMAL
SL AMB POCT NITRITE,UA: ABNORMAL
SL AMB POCT PH,UA: 7
SL AMB POCT SPECIFIC GRAVITY,UA: 1.02
SL AMB POCT URINE PROTEIN: ABNORMAL
SL AMB POCT UROBILINOGEN: 0.2

## 2024-04-16 PROCEDURE — 99214 OFFICE O/P EST MOD 30 MIN: CPT | Performed by: UROLOGY

## 2024-04-16 PROCEDURE — 81003 URINALYSIS AUTO W/O SCOPE: CPT | Performed by: UROLOGY

## 2024-04-16 RX ORDER — FLUTICASONE PROPIONATE 50 MCG
2 SPRAY, SUSPENSION (ML) NASAL 2 TIMES DAILY
COMMUNITY
Start: 2024-02-23

## 2024-04-16 NOTE — H&P
H&P Exam - Urology   Tonio Castro 74 y.o. male MRN: [id number]  Unit/Bed#:  Encounter: [id number]    Assessment/Plan    Assessment:  Erectile dysfunction with failure to respond to PDE 5 inhibitors and Trimix injection therapy  Plan:  Implantation of 3 part inflatable penile prosthesis    History of Present Illness  HPI:  Tonio Castro is a 74 y.o. male who presents with a multiyear history of progressive erectile dysfunction initially responding to oral PDE 5 inhibitors but shortly after initial successes was unable to respond.  The patient did not respond very well to Trimix injection therapy at maximal doses.  He presented to me in referral from urologic physicians assistant for discussion of penile implant surgery.  We discussed this as well as the CDs and the patient is opted to proceed with penile implant surgery.  He denies pelvic surgery whatsoever in the past other than greenlight laser surgery by Dr. Ordonez.  The patient has no erectile activity at the present time and requests implantation of a 3 part IPP understanding risks of bleeding infection need for explantation with or without immediate or delayed reimplantation the possibility of loss of length or girth chronic pain deformity or injury to the urinary tract.  Additional operative interventions were also discussed as a possible result of the surgery.  The patient agreed to the procedure and provided verbal and signed informed consent.    Review of Systems   All other systems reviewed and are negative.      Historical Information  Past Medical History:   Diagnosis Date    Arthritis     BPH with obstruction/lower urinary tract symptoms     CPAP (continuous positive airway pressure) dependence     Enlarged prostate     Erectile dysfunction     Feeling of incomplete bladder emptying     GERD (gastroesophageal reflux disease)     Microscopic hematuria     Poor urinary stream     Shortness of breath     Sleep apnea     Urgency of urination      Past  "Surgical History:   Procedure Laterality Date    COLONOSCOPY      JOINT REPLACEMENT Bilateral     knees    DC LASER VAPORIZATION OF PROSTATE FOR URINE FLOW N/A 12/1/2022    Procedure: TRANSURETHRAL RESECTION OF PROSTATE W/ GREEN LIGHT LASER;  Surgeon: Norman Ordonez MD;  Location: [facility];  Service: Urology    DC PROSTATE NEEDLE BIOPSY ANY APPROACH N/A 10/13/2020    Procedure: TRANSPERINEAL NEEDLE BIOPSY PROSTATE (TRNBP);  Surgeon: Andrew Burden MD;  Location: [facility];  Service: Urology     Social History  Social History     Substance and Sexual Activity   Alcohol Use Yes    Alcohol/week: 2.0 standard drinks of alcohol    Types: 2 Cans of beer per week    Comment: rare     Social History     Substance and Sexual Activity   Drug Use No     Social History     Tobacco Use   Smoking Status Former   Smokeless Tobacco Never     Family History: History reviewed. No pertinent family history.    Meds/Allergies  all medications and allergies reviewed  No Known Allergies    Objective  Vitals: Pulse 91, height 5' 9\" (1.753 m), weight 97.1 kg (214 lb), SpO2 96%.    [unfilled]    Invasive Devices       None                   Physical Exam  Vitals reviewed.   Constitutional:       General: He is not in acute distress.     Appearance: Normal appearance. He is obese. He is not ill-appearing, toxic-appearing or diaphoretic.   HENT:      Head: Normocephalic and atraumatic.      Nose: Nose normal.      Mouth/Throat:      Mouth: Mucous membranes are moist.   Eyes:      Extraocular Movements: Extraocular movements intact.   Cardiovascular:      Rate and Rhythm: Normal rate and regular rhythm.      Heart sounds: Normal heart sounds.   Pulmonary:      Effort: Pulmonary effort is normal. No respiratory distress.      Breath sounds: No wheezing, rhonchi or rales.   Abdominal:      General: There is no distension.      Palpations: Abdomen is soft.      Tenderness: There is no abdominal tenderness. There is no guarding or " rebound.   Genitourinary:     Penis: Normal.       Testes: Normal.      Prostate: Normal.      Rectum: Normal.   Musculoskeletal:         General: Normal range of motion.      Cervical back: Neck supple.   Skin:     General: Skin is dry.   Neurological:      Mental Status: He is alert and oriented to person, place, and time.   Psychiatric:         Mood and Affect: Mood normal.         Behavior: Behavior normal.         Thought Content: Thought content normal.         Judgment: Judgment normal.         Lab Results: I have personally reviewed pertinent reports.    Imaging: I have personally reviewed pertinent reports.    EKG, Pathology, and Other Studies: I have personally reviewed pertinent reports.    VTE Prophylaxis: Sequential compression device (Venodyne)  and Heparin    Code Status: [unfilled]  Advance Directive and Living Will:      Power of :    POLST:      Counseling / Coordination of Care  Total floor / unit time spent today 30 minutes.  Greater than 50% of total time was spent with the patient and / or family counseling and / or coordination of care.  A description of the counseling / coordination of care:  .

## 2024-04-16 NOTE — PROGRESS NOTES
H&P Exam - Urology   Tonio Castro 74 y.o. male MRN: [id number]  Unit/Bed#:  Encounter: [id number]    Assessment/Plan     Assessment:  Erectile dysfunction with failure to respond to PDE 5 inhibitors and Trimix injection therapy  Plan:  Implantation of 3 part inflatable penile prosthesis    History of Present Illness   HPI:  Tonio Castro is a 74 y.o. male who presents with a multiyear history of progressive erectile dysfunction initially responding to oral PDE 5 inhibitors but shortly after initial successes was unable to respond.  The patient did not respond very well to Trimix injection therapy at maximal doses.  He presented to me in referral from urologic physicians assistant for discussion of penile implant surgery.  We discussed this as well as the CDs and the patient is opted to proceed with penile implant surgery.  He denies pelvic surgery whatsoever in the past other than greenlight laser surgery by Dr. Ordonez.  The patient has no erectile activity at the present time and requests implantation of a 3 part IPP understanding risks of bleeding infection need for explantation with or without immediate or delayed reimplantation the possibility of loss of length or girth chronic pain deformity or injury to the urinary tract.  Additional operative interventions were also discussed as a possible result of the surgery.  The patient agreed to the procedure and provided verbal and signed informed consent.    Review of Systems   All other systems reviewed and are negative.      Historical Information   Past Medical History:   Diagnosis Date    Arthritis     BPH with obstruction/lower urinary tract symptoms     CPAP (continuous positive airway pressure) dependence     Enlarged prostate     Erectile dysfunction     Feeling of incomplete bladder emptying     GERD (gastroesophageal reflux disease)     Microscopic hematuria     Poor urinary stream     Shortness of breath     Sleep apnea     Urgency of urination      Past  "Surgical History:   Procedure Laterality Date    COLONOSCOPY      JOINT REPLACEMENT Bilateral     knees    AR LASER VAPORIZATION OF PROSTATE FOR URINE FLOW N/A 12/1/2022    Procedure: TRANSURETHRAL RESECTION OF PROSTATE W/ GREEN LIGHT LASER;  Surgeon: Norman Ordonez MD;  Location: [facility];  Service: Urology    AR PROSTATE NEEDLE BIOPSY ANY APPROACH N/A 10/13/2020    Procedure: TRANSPERINEAL NEEDLE BIOPSY PROSTATE (TRNBP);  Surgeon: Andrew Burden MD;  Location: [facility];  Service: Urology     Social History   Social History     Substance and Sexual Activity   Alcohol Use Yes    Alcohol/week: 2.0 standard drinks of alcohol    Types: 2 Cans of beer per week    Comment: rare     Social History     Substance and Sexual Activity   Drug Use No     Social History     Tobacco Use   Smoking Status Former   Smokeless Tobacco Never     Family History: History reviewed. No pertinent family history.    Meds/Allergies   all medications and allergies reviewed  No Known Allergies    Objective   Vitals: Pulse 91, height 5' 9\" (1.753 m), weight 97.1 kg (214 lb), SpO2 96%.    [unfilled]    Invasive Devices       None                   Physical Exam  Vitals reviewed.   Constitutional:       General: He is not in acute distress.     Appearance: Normal appearance. He is obese. He is not ill-appearing, toxic-appearing or diaphoretic.   HENT:      Head: Normocephalic and atraumatic.      Nose: Nose normal.      Mouth/Throat:      Mouth: Mucous membranes are moist.   Eyes:      Extraocular Movements: Extraocular movements intact.   Cardiovascular:      Rate and Rhythm: Normal rate and regular rhythm.      Heart sounds: Normal heart sounds.   Pulmonary:      Effort: Pulmonary effort is normal. No respiratory distress.      Breath sounds: No wheezing, rhonchi or rales.   Abdominal:      General: There is no distension.      Palpations: Abdomen is soft.      Tenderness: There is no abdominal tenderness. There is no guarding or " rebound.   Genitourinary:     Penis: Normal.       Testes: Normal.      Prostate: Normal.      Rectum: Normal.   Musculoskeletal:         General: Normal range of motion.      Cervical back: Neck supple.   Skin:     General: Skin is dry.   Neurological:      Mental Status: He is alert and oriented to person, place, and time.   Psychiatric:         Mood and Affect: Mood normal.         Behavior: Behavior normal.         Thought Content: Thought content normal.         Judgment: Judgment normal.         Lab Results: I have personally reviewed pertinent reports.    Imaging: I have personally reviewed pertinent reports.    EKG, Pathology, and Other Studies: I have personally reviewed pertinent reports.    VTE Prophylaxis: Sequential compression device (Venodyne)  and Heparin    Code Status: [unfilled]  Advance Directive and Living Will:      Power of :    POLST:      Counseling / Coordination of Care  Total floor / unit time spent today 30 minutes.  Greater than 50% of total time was spent with the patient and / or family counseling and / or coordination of care.  A description of the counseling / coordination of care:  .

## 2024-04-22 ENCOUNTER — TELEPHONE (OUTPATIENT)
Dept: UROLOGY | Facility: MEDICAL CENTER | Age: 74
End: 2024-04-22

## 2024-04-24 NOTE — TELEPHONE ENCOUNTER
Spoke with patient and confirmed surgery date of: 8/28/2024  Type of surgery: IPP  Operating physician: Dr. Mohr  Location of surgery:  [facility]    Verbally went over prep with patient on: 4/24/2024  NPO  Bowel prep? No  Hospital calls afternoon prior with arrival time -Calls Friday afternoon for Monday surgeries  Patient needs ride to and from surgery (outpatient/inpatient)   Pre-op testing to be done 8/19/2024 ([place] of country until 8[place]18/2024  CBC, CMP, Urine C&S and EKG  Blood thinners:   none  Clearances needed: none    Mailed to patient on: 4/26/2024 & 7/1/2024  Copy of packet scanned into Media on: 4/26/2024  Labs in packet  Soap prep in packet  Pre-op & Post-op in packet  H&P 8/19/2024 @ 10:00 AM at [place] office with Aleks (no openings at [facility])   post-op 9/5/2024 @ 10:00 AM at [place] office with Aleks    Consent: in Media   If needed:      Rep info: Richard clarke - [facility]  Emailed rep on date: 4/26/2024

## 2024-08-16 RX ORDER — LORATADINE 10 MG/1
10 TABLET ORAL DAILY
COMMUNITY
End: 2024-08-19 | Stop reason: CLARIF

## 2024-08-17 NOTE — H&P
History and physical examination_  I saw the patient in the holding area and again performed history and physical discussed the procedure as proposed step-by-step including risks and complications as outlined in detail in the history and physical below.  Patient's questions were answered and he reaffirmed previously signed informed consent verbally.      Assessment/Plan     Assessment:  Erectile dysfunction with failure to respond to PDE 5 inhibitors and Trimix injection therapy  Plan:  Implantation of 3 part inflatable penile prosthesis     History of Present Illness  HPI:  Tonio Castro is a 74 y.o. male who presents with a multiyear history of progressive erectile dysfunction initially responding to oral PDE 5 inhibitors but shortly after initial successes was unable to respond.  The patient did not respond very well to Trimix injection therapy at maximal doses.  He presented to me in referral from urologic physicians assistant for discussion of penile implant surgery.  We discussed this as well as the CDs and the patient is opted to proceed with penile implant surgery.  He denies pelvic surgery whatsoever in the past other than greenlight laser surgery by Dr. Ordonez.  The patient has no erectile activity at the present time and requests implantation of a 3 part IPP understanding risks of bleeding infection need for explantation with or without immediate or delayed reimplantation the possibility of loss of length or girth chronic pain deformity or injury to the urinary tract.  Additional operative interventions were also discussed as a possible result of the surgery.  The patient agreed to the procedure and provided verbal and signed informed consent.     Review of Systems   All other systems reviewed and are negative.        Historical Information       Past Medical History:   Diagnosis Date    Arthritis      BPH with obstruction/lower urinary tract symptoms      CPAP (continuous positive airway pressure)  dependence      Enlarged prostate      Erectile dysfunction      Feeling of incomplete bladder emptying      GERD (gastroesophageal reflux disease)      Microscopic hematuria      Poor urinary stream      Shortness of breath      Sleep apnea      Urgency of urination              Past Surgical History:   Procedure Laterality Date    COLONOSCOPY        JOINT REPLACEMENT Bilateral       knees    CO LASER VAPORIZATION OF PROSTATE FOR URINE FLOW N/A 12/1/2022     Procedure: TRANSURETHRAL RESECTION OF PROSTATE W/ GREEN LIGHT LASER;  Surgeon: Norman Ordonez MD;  Location: [facility];  Service: Urology    CO PROSTATE NEEDLE BIOPSY ANY APPROACH N/A 10/13/2020     Procedure: TRANSPERINEAL NEEDLE BIOPSY PROSTATE (TRNBP);  Surgeon: Andrew Burden MD;  Location: [facility];  Service: Urology      Social History  Social History           Substance and Sexual Activity   Alcohol Use Yes    Alcohol/week: 2.0 standard drinks of alcohol    Types: 2 Cans of beer per week     Comment: rare      Social History          Substance and Sexual Activity   Drug Use No      Social History          Tobacco Use   Smoking Status Former   Smokeless Tobacco Never      Family History: History reviewed. No pertinent family history.     Meds/Allergies  all medications and allergies reviewed  No Known Allergies     Objective       Physical Exam  Vitals reviewed.   Constitutional:       General: He is not in acute distress.     Appearance: Normal appearance. He is obese. He is not ill-appearing, toxic-appearing or diaphoretic.   HENT:      Head: Normocephalic and atraumatic.      Nose: Nose normal.      Mouth/Throat:      Mouth: Mucous membranes are moist.   Eyes:      Extraocular Movements: Extraocular movements intact.   Cardiovascular:      Rate and Rhythm: Normal rate and regular rhythm.      Heart sounds: Normal heart sounds.   Pulmonary:      Effort: Pulmonary effort is normal. No respiratory distress.      Breath sounds: No wheezing,  rhonchi or rales.   Abdominal:      General: There is no distension.      Palpations: Abdomen is soft.      Tenderness: There is no abdominal tenderness. There is no guarding or rebound.   Genitourinary:     Penis: Normal.       Testes: Normal.      Prostate: Normal.      Rectum: Normal.   Musculoskeletal:         General: Normal range of motion.      Cervical back: Neck supple.   Skin:     General: Skin is dry.   Neurological:      Mental Status: He is alert and oriented to person, place, and time.   Psychiatric:         Mood and Affect: Mood normal.         Behavior: Behavior normal.         Thought Content: Thought content normal.         Judgment: Judgment normal.            Lab Results: I have personally reviewed pertinent reports.    Imaging: I have personally reviewed pertinent reports.    EKG, Pathology, and Other Studies: I have personally reviewed pertinent reports.    VTE Prophylaxis: Sequential compression device (Venodyne)  and Heparin

## 2024-08-17 NOTE — DISCHARGE INSTRUCTIONS
Patient Education     Penile Prosthesis Insertion   Why is this procedure done?   Penile prosthesis insertion is a device put inside your penis to create an erection when needed. The doctor and patient decide which penile implant is best. Your doctor may suggest a penile prosthesis only if other methods have not worked or are not good choices for you. This procedure is not reversible. Your doctor may also recommend this if you have a severe bend in your penis or if you have had an erection that has lasted too long and has caused permanent damage.  There are two types of penile prosthesis:  Inflatable implant ? You use the pump in your scrotum to fill the inflatable tubes in your penis to get the erection.  Lon implant ? Bendable rods are placed inside your penis. The penis is firm and bendable. You will adjust or lift your penis before having sex.  What will the results be?   An implant lets you have an erection so that you can have sex.  What happens before the procedure?   Your doctor will take your history. Talk to your doctor about:  All the drugs you are taking. Be sure to include all prescription and over-the-counter (OTC) drugs, and herbal supplements. Tell the doctor about any drug allergy. Bring a list of drugs you take with you.  Any bleeding problems. Be sure to tell your doctor if you are taking any drugs that may cause bleeding. Some of these are warfarin, rivaroxaban, apixaban, ticagrelor, clopidogrel, ketorolac, ibuprofen, naproxen, or aspirin. Certain vitamins and herbs, such as garlic and fish oil, may also add to the risk for bleeding. You may need to stop these drugs as well. Talk to your doctor about them.  When you need to stop eating or drinking before your procedure.  Any previous belly or groin surgery you have had  Your doctor will do an exam and may order:  Lab tests  You will not be allowed to drive after the procedure. Plan another way to get home.  What happens during the procedure?    Once you are in the operating room, the staff will put an IV in your arm to give you fluids and drugs. You will be given a drug to make you sleepy. It will also help you stay pain free during the surgery.  Sometimes, the doctor will give you a special drug to make you numb for the surgery. Other times, you are completely asleep.  You will have a tube through your penis into your bladder to drain urine.  For inflatable implants, your doctor will make a cut at the top of your scrotum.  In two-piece implant, your doctor will put the cylinder into your penis and the pump into your scrotum.  In three-piece implant, your doctor will put the cylinder into your penis and the pump into your scrotum. Then, your doctor will put fluid into a collection balloon in your belly. Your doctor will connect the tubes to all of the devices.  For a july implant, your doctor will make a cut just behind the head or near the base of your penis.  Your doctor will make two openings in your penis. The doctor will put a long tube into each opening.  Your doctor will put one implant into each tube.  Your doctor will close the cuts with stitches or skin glue.  The procedure takes 30 minutes to 3 hours.  What happens after the procedure?   You will go to the Recovery Room and the staff will watch you closely. You may have to stay in the hospital overnight. You will get drugs to help you with your pain.  Your doctor or nurse will remove the tube in your bladder and make sure you can empty your bladder.  Your doctor will give you antibiotics and pain drugs.  What care is needed at home?   Ask your doctor what you need to do when you go home. Make sure you ask any questions if you do not understand what you need to do.  Talk to your doctor about how to care for your cut site. Ask your doctor about:  When you should change your bandages  When you may take a bath or shower  If you need to be careful with lifting things over 10 pounds (4.5 kg)  When  you may go back to your normal activities like work, driving, or sex  You may use soap and water to wash your wound. Make sure not to soak the wound. Do not swim or soak in a bath or hot tub until your doctor says you may. Gently towel-dry the wound afterwards.  Wear supportive underwear while you heal.  Place an ice pack or a bag of frozen peas wrapped in a towel over the painful part to lessen pain and swelling. Never put ice right on the skin. Do not leave the ice on more than 10 to 15 minutes at a time.  What follow-up care is needed?   Your doctor may ask you to make visits to the office to check on your progress. Be sure to keep your visits. You will not be able to use the device until your doctor says it is okay. Most often, this is about 6 weeks after surgery.  What lifestyle changes are needed?   Avoid tiring activities or sports for 2 to 3 weeks after the procedure.  Do not start very physical activity for about 6 weeks after the surgery. Ask your doctor about when you can return to working out.  You will need to wait for a while to have sex after your surgery. Ask your doctor when you can have sex.  Avoid smoking and drinking beer, wine, and mixed drinks (alcohol).  Your doctor will talk about ways of hiding the semirigid device in clothing.  Your doctor will talk with you and your partner on how to pump up and deflate your inflatable penis.  What problems could happen?   Bleeding  Infection  Device fails  Damage to the urethra or the bladder or the bowels  Bruising in the penis and scrotum  Device moves and comes out of the skin or urethra  When do I need to call the doctor?   Signs of wound infection such as a fever of 100.4°F (38°C) or higher, chills, swelling, redness, warmth around the wound; too much pain when touched; yellowish, greenish, or bloody discharge; foul smell coming from the cut site; cut site opens up.   Problems passing urine  Very bad pain or bleeding at the cut site  Last Reviewed  Date   2021-03-12  Consumer Information Use and Disclaimer   This generalized information is a limited summary of diagnosis, treatment, and/or medication information. It is not meant to be comprehensive and should be used as a tool to help the user understand and/or assess potential diagnostic and treatment options. It does NOT include all information about conditions, treatments, medications, side effects, or risks that may apply to a specific patient. It is not intended to be medical advice or a substitute for the medical advice, diagnosis, or treatment of a health care provider based on the health care provider's examination and assessment of a patient’s specific and unique circumstances. Patients must speak with a health care provider for complete information about their health, medical questions, and treatment options, including any risks or benefits regarding use of medications. This information does not endorse any treatments or medications as safe, effective, or approved for treating a specific patient. [employer] and its affiliates disclaim any warranty or liability relating to this information or the use thereof. The use of this information is governed by the Terms of Use, available at https://www.[employer].com/en/know/clinical-effectiveness-terms   Copyright   Copyright © 2024 [employer] and its affiliates and/or licensors. All rights reserved.

## 2024-08-19 ENCOUNTER — OFFICE VISIT (OUTPATIENT)
Dept: UROLOGY | Facility: AMBULATORY SURGERY CENTER | Age: 74
End: 2024-08-19

## 2024-08-19 VITALS
BODY MASS INDEX: 31.9 KG/M2 | DIASTOLIC BLOOD PRESSURE: 96 MMHG | SYSTOLIC BLOOD PRESSURE: 142 MMHG | OXYGEN SATURATION: 96 % | HEART RATE: 89 BPM | WEIGHT: 216 LBS

## 2024-08-19 DIAGNOSIS — N52.9 ERECTILE DYSFUNCTION, UNSPECIFIED ERECTILE DYSFUNCTION TYPE: ICD-10-CM

## 2024-08-19 DIAGNOSIS — N13.8 BPH WITH OBSTRUCTION/LOWER URINARY TRACT SYMPTOMS: Primary | ICD-10-CM

## 2024-08-19 DIAGNOSIS — N40.1 BPH WITH OBSTRUCTION/LOWER URINARY TRACT SYMPTOMS: Primary | ICD-10-CM

## 2024-08-19 PROCEDURE — 87086 URINE CULTURE/COLONY COUNT: CPT

## 2024-08-19 PROCEDURE — 87147 CULTURE TYPE IMMUNOLOGIC: CPT

## 2024-08-19 RX ORDER — CIPROFLOXACIN 500 MG/1
500 TABLET, FILM COATED ORAL EVERY 12 HOURS SCHEDULED
Qty: 14 TABLET | Refills: 0 | Status: SHIPPED | OUTPATIENT
Start: 2024-08-19 | End: 2024-08-26

## 2024-08-19 RX ORDER — TAMSULOSIN HYDROCHLORIDE 0.4 MG/1
0.4 CAPSULE ORAL
Qty: 30 CAPSULE | Refills: 3 | Status: SHIPPED | OUTPATIENT
Start: 2024-08-19 | End: 2024-12-17

## 2024-08-19 NOTE — PROGRESS NOTES
Pre-op visit  8/19/2024      Assessment and Plan     74 y.o. male managed by Dr. Mohr    1.  Erectile dysfunction with failure to respond to PDE 5 inhibitors and intracavernosal Trimix injection therapy    History and physical was performed for the patients upcoming implantation of a 3 part inflatable penile prosthesis scheduled for 8/28/2024 with Dr. Mohr. All questions and concerns regarding surgery and perioperative expectations have been addressed and answered.  No overall changes in their health since last visit, denies any prior complications with anesthesia.  Will proceed with surgery as planned.        History of Present Illness  Tonio Castro is a 74 y.o. male here for history and physical prior to their upcoming surgery.    Urologic history as below:  Patient has a significant past urological history for progressive erectile dysfunction initially responding to oral PDE 5 inhibitors, but shortly after initial success was unable to respond to the oral pharmacotherapy.  The patient then was trialed on Trimix and was progressively increased to the 100/3/30 dose which the patient did not respond well to at the maximal dose.  It was discussed with the patient and that time that the next consideration would be consultation with Dr. Mohr to discuss implantation of a penile prosthesis.  Patient was seen for consultation on 4/16/2024 and discussed options regarding implantation of a penile prosthesis.  The patient agreed to undergo implantation of a 3 part inflatable penile prosthesis at that time.    Additionally, the patient previously underwent a greenlight laser surgery performed by Dr. Ordonez on 12/1/2022 for BPH and related lower urinary tract symptoms.  The patient is currently content with his voiding pattern off of pharmacotherapy.  The patient denies any other pelvic surgeries.  Today, the patient reports nocturia x 3, some degree of weakened urinary stream, and postvoid dribbling.  The  patient will initiate Flomax 0.4 mg daily with dinner.    The patient's last PSA was performed 12/4/2023 and returned at a value of 1.77 and remains stable.    The risks involved with undergoing implantation of a 3 part implantable penile prosthesis were discussed and include but are not limited to bleeding, infection, need for explantation with or without immediate or delayed reimplantation, the possibility of loss of length or girth, chronic pain, deformity, or injury to the urinary tract were discussed with the patient.  The patient verbalizes understanding of the risk involved.  Patient was prescribed a 7-day course of ciprofloxacin which the patient was advised to initiate on 8/21/2024.    Patient will undergo EKG following today's office visit.  Additionally, the patient will undergo CBC and CMP first thing tomorrow morning as we discussed that it needs to be a fasting blood draw.  Urine sample from today's office visit will be sent for culture.      Review of Systems   Constitutional:  Negative for chills and fever.   HENT:  Negative for ear pain and sore throat.    Eyes:  Negative for pain and visual disturbance.   Respiratory:  Negative for cough and shortness of breath.    Cardiovascular:  Negative for chest pain and palpitations.   Gastrointestinal:  Negative for abdominal pain and vomiting.   Genitourinary:  Positive for difficulty urinating (Weakened urinary stream). Negative for decreased urine volume, dysuria, flank pain, frequency, hematuria, penile pain, penile swelling and urgency.        Nocturia x 3   Musculoskeletal:  Negative for arthralgias and back pain.   Skin:  Negative for color change and rash.   Neurological:  Negative for seizures and syncope.   All other systems reviewed and are negative.      Vitals  There were no vitals filed for this visit.    Physical Exam  Vitals and nursing note reviewed.   Constitutional:       General: He is not in acute distress.     Appearance: He is  well-developed.   HENT:      Head: Normocephalic and atraumatic.      Nose: Nose normal.   Eyes:      General: No scleral icterus.     Conjunctiva/sclera: Conjunctivae normal.   Cardiovascular:      Rate and Rhythm: Normal rate and regular rhythm.      Heart sounds: No murmur heard.  Pulmonary:      Effort: Pulmonary effort is normal. No respiratory distress.      Breath sounds: Normal breath sounds.   Abdominal:      General: Abdomen is flat. There is no distension.      Palpations: Abdomen is soft.      Tenderness: There is no abdominal tenderness.   Genitourinary:     Comments: Circumcised penis, normal phallus, orthotopic patent meatus.  Testes smooth descended bilaterally into the scrotum nontender with no palpable mass.  Musculoskeletal:         General: No swelling.      Cervical back: Neck supple.   Skin:     General: Skin is warm and dry.      Coloration: Skin is not jaundiced.   Neurological:      Mental Status: He is alert and oriented to person, place, and time.      Gait: Gait normal.   Psychiatric:         Mood and Affect: Mood normal.             Past Medical History  Past Medical History:   Diagnosis Date    Arthritis     BPH with obstruction/lower urinary tract symptoms     CPAP (continuous positive airway pressure) dependence     Enlarged prostate     Erectile dysfunction     Feeling of incomplete bladder emptying     GERD (gastroesophageal reflux disease)     Microscopic hematuria     Poor urinary stream     Shortness of breath     Sleep apnea     Urgency of urination        Past Social History  Past Surgical History:   Procedure Laterality Date    COLONOSCOPY      JOINT REPLACEMENT Bilateral     knees    AR LASER VAPORIZATION OF PROSTATE FOR URINE FLOW N/A 12/1/2022    Procedure: TRANSURETHRAL RESECTION OF PROSTATE W/ GREEN LIGHT LASER;  Surgeon: Norman Ordonez MD;  Location: [facility];  Service: Urology    AR PROSTATE NEEDLE BIOPSY ANY APPROACH N/A 10/13/2020    Procedure: TRANSPERINEAL  NEEDLE BIOPSY PROSTATE (TRNBP);  Surgeon: Andrew Burden MD;  [facility];  Service: Urology       Past Family History  No family history on file.    Past Social history  Social History     Socioeconomic History    Marital status:      Spouse name: Not on file    Number of children: Not on file    Years of education: Not on file    Highest education level: Not on file   Occupational History    Not on file   Tobacco Use    Smoking status: Former    Smokeless tobacco: Never   Vaping Use    Vaping status: Never Used   Substance and Sexual Activity    Alcohol use: Yes     Alcohol/week: 2.0 standard drinks of alcohol     Types: 2 Cans of beer per week     Comment: rare    Drug use: No    Sexual activity: Not on file   Other Topics Concern    Not on file   Social History Narrative    Not on file     Social Determinants of Health     Financial Resource Strain: Low Risk  (11/17/2023)    [facility], [facility]    Overall Financial Resource Strain (CARDIA)     Difficulty of Paying Living Expenses: Not hard at all   Food Insecurity: No Food Insecurity (11/17/2023)    Received from [facility], [facility]    Hunger Vital Sign     Worried About Running Out of Food in the Last Year: Never true     Ran Out of Food in the Last Year: Never true   Transportation Needs: No Transportation Needs (11/17/2023)    Received from [facility], [facility]    PRAPARE - Transportation     Lack of Transportation (Medical): No     Lack of Transportation (Non-Medical): No   Physical Activity: Inactive (11/17/2023)    Received from [facility]    Exercise Vital Sign     Days of Exercise per Week: 2 days     Minutes of Exercise per Session: 0 min   Stress: No Stress Concern Present (11/17/2023)    Received from [facility], [facility] of  Occupational Health - Occupational Stress Questionnaire     Feeling of Stress : Not at all   Social Connections: Moderately Integrated (11/17/2023)    Received from [facility], [facility]    Social Connection and Isolation Panel [NHANES]     Frequency of Communication with Friends and Family: More than three times a week     Frequency of Social Gatherings with Friends and Family: Twice a week     Attends Taoist Services: More than 4 times per year     Active Member of Clubs or Organizations: Yes     Attends Club or Organization Meetings: More than 4 times per year     Marital Status:    Intimate Partner Violence: Not At Risk (11/17/2023)    Received from [facility], [facility]    Humiliation, Afraid, Rape, and Kick questionnaire     Fear of Current or Ex-Partner: No     Emotionally Abused: No     Physically Abused: No     Sexually Abused: No   Housing Stability: Low Risk  (11/17/2023)    Received from [facility], [facility]    Housing Stability Vital Sign     Unable to Pay for Housing in the Last Year: No     Number of Places Lived in the Last Year: 1     Unstable Housing in the Last Year: No       Current Medications  Current Outpatient Medications   Medication Sig Dispense Refill    atorvastatin (LIPITOR) 20 mg tablet Take 20 mg by mouth daily      fluticasone (FLONASE) 50 mcg/act nasal spray 2 sprays into each nostril 2 (two) times a day      latanoprost (XALATAN) 0.005 % ophthalmic solution USE 1 DROP INTO BOTH EYES AT BEDTIME  5    metoprolol tartrate (LOPRESSOR) 50 mg tablet Take 25 mg by mouth daily after dinner      Multiple Vitamin (MULTI-VITAMIN DAILY) TABS Take by mouth      omeprazole (PriLOSEC) 20 mg delayed release capsule Take 20 mg by mouth daily       No current facility-administered medications for this visit.       Allergies  No Known Allergies      Past Medical History, Social  History, Family History, medications and allergies were reviewed.

## 2024-08-20 ENCOUNTER — APPOINTMENT (OUTPATIENT)
Dept: LAB | Facility: HOSPITAL | Age: 74
End: 2024-08-20
Payer: COMMERCIAL

## 2024-08-20 DIAGNOSIS — N52.9 ERECTILE DYSFUNCTION, UNSPECIFIED ERECTILE DYSFUNCTION TYPE: ICD-10-CM

## 2024-08-20 DIAGNOSIS — Z01.810 PRE-OPERATIVE CARDIOVASCULAR EXAMINATION: ICD-10-CM

## 2024-08-20 DIAGNOSIS — Z01.812 PRE-OPERATIVE LABORATORY EXAMINATION: ICD-10-CM

## 2024-08-20 DIAGNOSIS — R39.89 SUSPECTED UTI: ICD-10-CM

## 2024-08-20 LAB
ALBUMIN SERPL BCG-MCNC: 4 G/DL (ref 3.5–5)
ALP SERPL-CCNC: 87 U/L (ref 34–104)
ALT SERPL W P-5'-P-CCNC: 29 U/L (ref 7–52)
ANION GAP SERPL CALCULATED.3IONS-SCNC: 6 MMOL/L (ref 4–13)
AST SERPL W P-5'-P-CCNC: 25 U/L (ref 13–39)
ATRIAL RATE: 83 BPM
BACTERIA UR CULT: ABNORMAL
BASOPHILS # BLD AUTO: 0.02 THOUSANDS/ÂΜL (ref 0–0.1)
BASOPHILS NFR BLD AUTO: 0 % (ref 0–1)
BILIRUB SERPL-MCNC: 0.61 MG/DL (ref 0.2–1)
BUN SERPL-MCNC: 28 MG/DL (ref 5–25)
CALCIUM SERPL-MCNC: 8.9 MG/DL (ref 8.4–10.2)
CHLORIDE SERPL-SCNC: 106 MMOL/L (ref 96–108)
CO2 SERPL-SCNC: 27 MMOL/L (ref 21–32)
CREAT SERPL-MCNC: 0.86 MG/DL (ref 0.6–1.3)
EOSINOPHIL # BLD AUTO: 0.13 THOUSAND/ÂΜL (ref 0–0.61)
EOSINOPHIL NFR BLD AUTO: 2 % (ref 0–6)
ERYTHROCYTE [DISTWIDTH] IN BLOOD BY AUTOMATED COUNT: 13.2 % (ref 11.6–15.1)
GFR SERPL CREATININE-BSD FRML MDRD: 85 ML/MIN/1.73SQ M
GLUCOSE P FAST SERPL-MCNC: 96 MG/DL (ref 65–99)
HCT VFR BLD AUTO: 43.3 % (ref 36.5–49.3)
HGB BLD-MCNC: 15 G/DL (ref 12–17)
IMM GRANULOCYTES # BLD AUTO: 0.01 THOUSAND/UL (ref 0–0.2)
IMM GRANULOCYTES NFR BLD AUTO: 0 % (ref 0–2)
LYMPHOCYTES # BLD AUTO: 1.28 THOUSANDS/ÂΜL (ref 0.6–4.47)
LYMPHOCYTES NFR BLD AUTO: 23 % (ref 14–44)
MCH RBC QN AUTO: 32.5 PG (ref 26.8–34.3)
MCHC RBC AUTO-ENTMCNC: 34.6 G/DL (ref 31.4–37.4)
MCV RBC AUTO: 94 FL (ref 82–98)
MONOCYTES # BLD AUTO: 0.66 THOUSAND/ÂΜL (ref 0.17–1.22)
MONOCYTES NFR BLD AUTO: 12 % (ref 4–12)
NEUTROPHILS # BLD AUTO: 3.56 THOUSANDS/ÂΜL (ref 1.85–7.62)
NEUTS SEG NFR BLD AUTO: 63 % (ref 43–75)
NRBC BLD AUTO-RTO: 0 /100 WBCS
P AXIS: 22 DEGREES
PLATELET # BLD AUTO: 127 THOUSANDS/UL (ref 149–390)
PMV BLD AUTO: 11.2 FL (ref 8.9–12.7)
POTASSIUM SERPL-SCNC: 3.7 MMOL/L (ref 3.5–5.3)
PR INTERVAL: 176 MS
PROT SERPL-MCNC: 6.7 G/DL (ref 6.4–8.4)
QRS AXIS: -69 DEGREES
QRSD INTERVAL: 102 MS
QT INTERVAL: 402 MS
QTC INTERVAL: 472 MS
RBC # BLD AUTO: 4.62 MILLION/UL (ref 3.88–5.62)
SODIUM SERPL-SCNC: 139 MMOL/L (ref 135–147)
T WAVE AXIS: -9 DEGREES
VENTRICULAR RATE: 83 BPM
WBC # BLD AUTO: 5.66 THOUSAND/UL (ref 4.31–10.16)

## 2024-08-20 PROCEDURE — 85025 COMPLETE CBC W/AUTO DIFF WBC: CPT

## 2024-08-20 PROCEDURE — 93010 ELECTROCARDIOGRAM REPORT: CPT | Performed by: STUDENT IN AN ORGANIZED HEALTH CARE EDUCATION/TRAINING PROGRAM

## 2024-08-20 PROCEDURE — 80053 COMPREHEN METABOLIC PANEL: CPT

## 2024-08-20 PROCEDURE — 36415 COLL VENOUS BLD VENIPUNCTURE: CPT

## 2024-08-20 PROCEDURE — 87086 URINE CULTURE/COLONY COUNT: CPT

## 2024-08-20 PROCEDURE — 87077 CULTURE AEROBIC IDENTIFY: CPT

## 2024-08-20 RX ORDER — AMLODIPINE BESYLATE 2.5 MG/1
2.5 TABLET ORAL DAILY
COMMUNITY

## 2024-08-20 NOTE — PRE-PROCEDURE INSTRUCTIONS
Pre-Surgery Instructions:   Medication Instructions    amLODIPine (NORVASC) 2.5 mg tablet Take day of surgery.    atorvastatin (LIPITOR) 20 mg tablet Take night before surgery    ciprofloxacin (CIPRO) 500 mg tablet Take as prescribed    fluticasone (FLONASE) 50 mcg/act nasal spray Uses PRN- OK to take day of surgery    latanoprost (XALATAN) 0.005 % ophthalmic solution Take night before surgery    metoprolol tartrate (LOPRESSOR) 50 mg tablet Take night before surgery    Multiple Vitamin (MULTI-VITAMIN DAILY) TABS Stop taking 7 days prior to surgery.    omeprazole (PriLOSEC) 20 mg delayed release capsule Take day of surgery.    tamsulosin (FLOMAX) 0.4 mg Take night before surgery   Medication instructions for day surgery reviewed. Please use only a sip of water to take your instructed medications. Avoid all over the counter vitamins, supplements and NSAIDS for one week prior to surgery per anesthesia guidelines. Tylenol is ok to take as needed.     You will receive a call one business day prior to surgery with an arrival time and hospital directions. If your surgery is scheduled on a Monday, the hospital will be calling you on the Friday prior to your surgery. If you have not heard from anyone by 8pm, please call the hospital supervisor through the hospital  at [phone]. ([place] [phone] or [place] [phone]).    Do not eat or drink anything after midnight the night before your surgery, including candy, mints, lifesavers, or chewing gum. Do not drink alcohol 24hrs before your surgery. Try not to smoke at least 24hrs before your surgery.       Follow the pre surgery showering instructions as listed in the “My Surgical Experience Booklet” or otherwise provided by your surgeon's office. Do not use a blade to shave the surgical area 1 week before surgery. It is okay to use a clean electric clippers up to 24 hours before surgery. Do not apply any lotions, creams, including makeup, cologne, deodorant,  or perfumes after showering on the day of your surgery. Do not use dry shampoo, hair spray, hair gel, or any type of hair products.     No contact lenses, eye make-up, or artificial eyelashes. Remove nail polish, including gel polish, and any artificial, gel, or acrylic nails if possible. Remove all jewelry including rings and body piercing jewelry.     Wear causal clothing that is easy to take on and off. Consider your type of surgery.    Keep any valuables, jewelry, piercings at home. Please bring any specially ordered equipment (sling, braces) if indicated.    Arrange for a responsible person to drive you to and from the hospital on the day of your surgery. Please confirm the visitor policy for the day of your procedure when you receive your phone call with an arrival time.     Call the surgeon's office with any new illnesses, exposures, or additional questions prior to surgery.    Please reference your “My Surgical Experience Booklet” for additional information to prepare for your upcoming surgery.

## 2024-08-22 LAB — BACTERIA UR CULT: ABNORMAL

## 2024-08-26 ENCOUNTER — ANESTHESIA EVENT (OUTPATIENT)
Dept: PERIOP | Facility: HOSPITAL | Age: 74
End: 2024-08-26
Payer: COMMERCIAL

## 2024-08-28 ENCOUNTER — HOSPITAL ENCOUNTER (OUTPATIENT)
Facility: HOSPITAL | Age: 74
Setting detail: OUTPATIENT SURGERY
Discharge: HOME | End: 2024-08-29
Attending: UROLOGY | Admitting: UROLOGY
Payer: COMMERCIAL

## 2024-08-28 ENCOUNTER — ANESTHESIA (OUTPATIENT)
Dept: PERIOP | Facility: HOSPITAL | Age: 74
End: 2024-08-28
Payer: COMMERCIAL

## 2024-08-28 DIAGNOSIS — N52.1 ERECTILE DYSFUNCTION DUE TO DISEASES CLASSIFIED ELSEWHERE: Primary | ICD-10-CM

## 2024-08-28 LAB — GLUCOSE SERPL-MCNC: 166 MG/DL (ref 65–140)

## 2024-08-28 PROCEDURE — 82948 REAGENT STRIP/BLOOD GLUCOSE: CPT

## 2024-08-28 PROCEDURE — 54405 INSERT MULTI-COMP PENIS PROS: CPT | Performed by: UROLOGY

## 2024-08-28 PROCEDURE — C1813 PROSTHESIS, PENILE, INFLATAB: HCPCS | Performed by: UROLOGY

## 2024-08-28 PROCEDURE — 54405 INSERT MULTI-COMP PENIS PROS: CPT | Performed by: PHYSICIAN ASSISTANT

## 2024-08-28 PROCEDURE — NC001 PR NO CHARGE: Performed by: UROLOGY

## 2024-08-28 PROCEDURE — C2622 PROSTHESIS, PENILE, NON-INF: HCPCS | Performed by: UROLOGY

## 2024-08-28 PROCEDURE — NC001 PR NO CHARGE: Performed by: PHYSICIAN ASSISTANT

## 2024-08-28 DEVICE — INFLATABLE PENILE PROSTHESIS, INHIBIZONE/PRECONNECTED - PENOSCROTAL 1 PUMP 2 CYLINDERS WITH 10 CM LONG PRECONNECT TUBING
Type: IMPLANTABLE DEVICE | Site: PENIS | Status: FUNCTIONAL
Brand: AMS 700 CX MS PUMP

## 2024-08-28 DEVICE — INFLATABLE PENILE PROSTHESIS ACCESSORY KIT WITH MS PUMP
Type: IMPLANTABLE DEVICE | Site: PENIS | Status: FUNCTIONAL
Brand: AMS 700 ACCESSORY KIT

## 2024-08-28 DEVICE — NON-STACKABLE REAR TIP EXTENDERS FOR AMS 700 CX AND LGX CYLINDERS
Type: IMPLANTABLE DEVICE | Site: PENIS | Status: FUNCTIONAL
Brand: REAR TIP EXTENDER

## 2024-08-28 DEVICE — AMS 700 PENILE PROSTHESIS, 1 LOW PROFILE RESERVOIR, UP TO 100 ML, INHIBIZONE TREATED
Type: IMPLANTABLE DEVICE | Site: PENIS | Status: FUNCTIONAL
Brand: CONCEAL

## 2024-08-28 RX ORDER — FENTANYL CITRATE 50 UG/ML
INJECTION, SOLUTION INTRAMUSCULAR; INTRAVENOUS AS NEEDED
Status: DISCONTINUED | OUTPATIENT
Start: 2024-08-28 | End: 2024-08-28

## 2024-08-28 RX ORDER — CIPROFLOXACIN 500 MG/1
500 TABLET, FILM COATED ORAL EVERY 12 HOURS SCHEDULED
Status: DISCONTINUED | OUTPATIENT
Start: 2024-08-29 | End: 2024-08-29 | Stop reason: HOSPADM

## 2024-08-28 RX ORDER — FLUTICASONE PROPIONATE 50 MCG
2 SPRAY, SUSPENSION (ML) NASAL 2 TIMES DAILY
Status: DISCONTINUED | OUTPATIENT
Start: 2024-08-28 | End: 2024-08-29 | Stop reason: HOSPADM

## 2024-08-28 RX ORDER — AMLODIPINE BESYLATE 2.5 MG/1
2.5 TABLET ORAL DAILY
Status: DISCONTINUED | OUTPATIENT
Start: 2024-08-28 | End: 2024-08-29 | Stop reason: HOSPADM

## 2024-08-28 RX ORDER — METOPROLOL TARTRATE 25 MG/1
25 TABLET, FILM COATED ORAL
Status: DISCONTINUED | OUTPATIENT
Start: 2024-08-28 | End: 2024-08-29 | Stop reason: HOSPADM

## 2024-08-28 RX ORDER — HEPARIN SODIUM 5000 [USP'U]/ML
5000 INJECTION, SOLUTION INTRAVENOUS; SUBCUTANEOUS EVERY 12 HOURS SCHEDULED
Status: DISCONTINUED | OUTPATIENT
Start: 2024-08-28 | End: 2024-08-29 | Stop reason: HOSPADM

## 2024-08-28 RX ORDER — KETOROLAC TROMETHAMINE 30 MG/ML
INJECTION, SOLUTION INTRAMUSCULAR; INTRAVENOUS AS NEEDED
Status: DISCONTINUED | OUTPATIENT
Start: 2024-08-28 | End: 2024-08-28

## 2024-08-28 RX ORDER — HEPARIN SODIUM 5000 [USP'U]/ML
5000 INJECTION, SOLUTION INTRAVENOUS; SUBCUTANEOUS ONCE
Status: COMPLETED | OUTPATIENT
Start: 2024-08-28 | End: 2024-08-28

## 2024-08-28 RX ORDER — LIDOCAINE HYDROCHLORIDE 20 MG/ML
JELLY TOPICAL AS NEEDED
Status: DISCONTINUED | OUTPATIENT
Start: 2024-08-28 | End: 2024-08-28 | Stop reason: HOSPADM

## 2024-08-28 RX ORDER — OXYCODONE AND ACETAMINOPHEN 5; 325 MG/1; MG/1
1 TABLET ORAL EVERY 4 HOURS PRN
Status: DISCONTINUED | OUTPATIENT
Start: 2024-08-28 | End: 2024-08-29 | Stop reason: HOSPADM

## 2024-08-28 RX ORDER — LIDOCAINE HCL/PF 100 MG/5ML
SYRINGE (ML) INJECTION AS NEEDED
Status: DISCONTINUED | OUTPATIENT
Start: 2024-08-28 | End: 2024-08-28

## 2024-08-28 RX ORDER — OXYCODONE AND ACETAMINOPHEN 5; 325 MG/1; MG/1
1 TABLET ORAL EVERY 8 HOURS PRN
Qty: 12 TABLET | Refills: 0 | Status: SHIPPED | OUTPATIENT
Start: 2024-08-28 | End: 2024-09-07

## 2024-08-28 RX ORDER — MAGNESIUM HYDROXIDE 1200 MG/15ML
LIQUID ORAL AS NEEDED
Status: DISCONTINUED | OUTPATIENT
Start: 2024-08-28 | End: 2024-08-28 | Stop reason: HOSPADM

## 2024-08-28 RX ORDER — ONDANSETRON 2 MG/ML
4 INJECTION INTRAMUSCULAR; INTRAVENOUS ONCE AS NEEDED
Status: DISCONTINUED | OUTPATIENT
Start: 2024-08-28 | End: 2024-08-28 | Stop reason: HOSPADM

## 2024-08-28 RX ORDER — CIPROFLOXACIN 500 MG/1
500 TABLET, FILM COATED ORAL EVERY 12 HOURS SCHEDULED
Qty: 14 TABLET | Refills: 0 | Status: SHIPPED | OUTPATIENT
Start: 2024-08-29 | End: 2024-09-05

## 2024-08-28 RX ORDER — PROPOFOL 10 MG/ML
INJECTION, EMULSION INTRAVENOUS AS NEEDED
Status: DISCONTINUED | OUTPATIENT
Start: 2024-08-28 | End: 2024-08-28

## 2024-08-28 RX ORDER — ATORVASTATIN CALCIUM 20 MG/1
20 TABLET, FILM COATED ORAL
Status: DISCONTINUED | OUTPATIENT
Start: 2024-08-28 | End: 2024-08-29 | Stop reason: HOSPADM

## 2024-08-28 RX ORDER — FENTANYL CITRATE/PF 50 MCG/ML
25 SYRINGE (ML) INJECTION
Status: DISCONTINUED | OUTPATIENT
Start: 2024-08-28 | End: 2024-08-28 | Stop reason: HOSPADM

## 2024-08-28 RX ORDER — DEXTROSE MONOHYDRATE, SODIUM CHLORIDE, AND POTASSIUM CHLORIDE 50; 1.49; 4.5 G/1000ML; G/1000ML; G/1000ML
75 INJECTION, SOLUTION INTRAVENOUS CONTINUOUS
Status: DISCONTINUED | OUTPATIENT
Start: 2024-08-28 | End: 2024-08-29 | Stop reason: HOSPADM

## 2024-08-28 RX ORDER — SODIUM CHLORIDE, SODIUM LACTATE, POTASSIUM CHLORIDE, CALCIUM CHLORIDE 600; 310; 30; 20 MG/100ML; MG/100ML; MG/100ML; MG/100ML
125 INJECTION, SOLUTION INTRAVENOUS CONTINUOUS
Status: DISCONTINUED | OUTPATIENT
Start: 2024-08-28 | End: 2024-08-28

## 2024-08-28 RX ORDER — TAMSULOSIN HYDROCHLORIDE 0.4 MG/1
0.4 CAPSULE ORAL
Status: DISCONTINUED | OUTPATIENT
Start: 2024-08-28 | End: 2024-08-29 | Stop reason: HOSPADM

## 2024-08-28 RX ORDER — HYDROMORPHONE HCL/PF 1 MG/ML
0.5 SYRINGE (ML) INJECTION
Status: DISCONTINUED | OUTPATIENT
Start: 2024-08-28 | End: 2024-08-28 | Stop reason: HOSPADM

## 2024-08-28 RX ORDER — MORPHINE SULFATE 4 MG/ML
4 INJECTION, SOLUTION INTRAMUSCULAR; INTRAVENOUS EVERY 6 HOURS PRN
Status: DISCONTINUED | OUTPATIENT
Start: 2024-08-28 | End: 2024-08-29 | Stop reason: HOSPADM

## 2024-08-28 RX ORDER — PANTOPRAZOLE SODIUM 40 MG/1
40 TABLET, DELAYED RELEASE ORAL
Status: DISCONTINUED | OUTPATIENT
Start: 2024-08-28 | End: 2024-08-29 | Stop reason: HOSPADM

## 2024-08-28 RX ORDER — DEXAMETHASONE SODIUM PHOSPHATE 10 MG/ML
INJECTION, SOLUTION INTRAMUSCULAR; INTRAVENOUS AS NEEDED
Status: DISCONTINUED | OUTPATIENT
Start: 2024-08-28 | End: 2024-08-28

## 2024-08-28 RX ORDER — ROCURONIUM BROMIDE 10 MG/ML
INJECTION, SOLUTION INTRAVENOUS AS NEEDED
Status: DISCONTINUED | OUTPATIENT
Start: 2024-08-28 | End: 2024-08-28

## 2024-08-28 RX ORDER — SODIUM CHLORIDE, SODIUM LACTATE, POTASSIUM CHLORIDE, CALCIUM CHLORIDE 600; 310; 30; 20 MG/100ML; MG/100ML; MG/100ML; MG/100ML
INJECTION, SOLUTION INTRAVENOUS CONTINUOUS PRN
Status: DISCONTINUED | OUTPATIENT
Start: 2024-08-28 | End: 2024-08-28

## 2024-08-28 RX ORDER — ONDANSETRON 2 MG/ML
INJECTION INTRAMUSCULAR; INTRAVENOUS AS NEEDED
Status: DISCONTINUED | OUTPATIENT
Start: 2024-08-28 | End: 2024-08-28

## 2024-08-28 RX ADMIN — FLUTICASONE PROPIONATE 2 SPRAY: 50 SPRAY, METERED NASAL at 17:48

## 2024-08-28 RX ADMIN — SODIUM CHLORIDE, SODIUM LACTATE, POTASSIUM CHLORIDE, AND CALCIUM CHLORIDE 125 ML/HR: .6; .31; .03; .02 INJECTION, SOLUTION INTRAVENOUS at 05:56

## 2024-08-28 RX ADMIN — SUGAMMADEX 200 MG: 100 INJECTION, SOLUTION INTRAVENOUS at 10:10

## 2024-08-28 RX ADMIN — PROPOFOL 50 MG: 10 INJECTION, EMULSION INTRAVENOUS at 09:53

## 2024-08-28 RX ADMIN — LIDOCAINE HYDROCHLORIDE 100 MG: 20 INJECTION INTRAVENOUS at 07:39

## 2024-08-28 RX ADMIN — FENTANYL CITRATE 25 MCG: 50 INJECTION INTRAMUSCULAR; INTRAVENOUS at 08:40

## 2024-08-28 RX ADMIN — ATORVASTATIN CALCIUM 20 MG: 20 TABLET, FILM COATED ORAL at 16:48

## 2024-08-28 RX ADMIN — HEPARIN SODIUM 5000 UNITS: 5000 INJECTION INTRAVENOUS; SUBCUTANEOUS at 05:50

## 2024-08-28 RX ADMIN — METOPROLOL TARTRATE 25 MG: 25 TABLET, FILM COATED ORAL at 17:49

## 2024-08-28 RX ADMIN — SODIUM CHLORIDE, SODIUM LACTATE, POTASSIUM CHLORIDE, AND CALCIUM CHLORIDE: .6; .31; .03; .02 INJECTION, SOLUTION INTRAVENOUS at 07:35

## 2024-08-28 RX ADMIN — PROPOFOL 50 MG: 10 INJECTION, EMULSION INTRAVENOUS at 08:39

## 2024-08-28 RX ADMIN — TAMSULOSIN HYDROCHLORIDE 0.4 MG: 0.4 CAPSULE ORAL at 16:48

## 2024-08-28 RX ADMIN — AMPICILLIN SODIUM AND SULBACTAM SODIUM 3 G: 2; 1 INJECTION, POWDER, FOR SOLUTION INTRAMUSCULAR; INTRAVENOUS at 07:48

## 2024-08-28 RX ADMIN — FENTANYL CITRATE 25 MCG: 50 INJECTION INTRAMUSCULAR; INTRAVENOUS at 08:29

## 2024-08-28 RX ADMIN — KETOROLAC TROMETHAMINE 15 MG: 30 INJECTION, SOLUTION INTRAMUSCULAR; INTRAVENOUS at 09:47

## 2024-08-28 RX ADMIN — DEXTROSE, SODIUM CHLORIDE, AND POTASSIUM CHLORIDE 75 ML/HR: 5; .45; .15 INJECTION INTRAVENOUS at 11:32

## 2024-08-28 RX ADMIN — DEXAMETHASONE SODIUM PHOSPHATE 5 MG: 10 INJECTION INTRAMUSCULAR; INTRAVENOUS at 07:48

## 2024-08-28 RX ADMIN — ROCURONIUM BROMIDE 20 MG: 10 INJECTION, SOLUTION INTRAVENOUS at 08:39

## 2024-08-28 RX ADMIN — ROCURONIUM BROMIDE 50 MG: 10 INJECTION, SOLUTION INTRAVENOUS at 07:39

## 2024-08-28 RX ADMIN — PANTOPRAZOLE SODIUM 40 MG: 40 TABLET, DELAYED RELEASE ORAL at 11:34

## 2024-08-28 RX ADMIN — FENTANYL CITRATE 25 MCG: 50 INJECTION INTRAMUSCULAR; INTRAVENOUS at 08:38

## 2024-08-28 RX ADMIN — PROPOFOL 150 MG: 10 INJECTION, EMULSION INTRAVENOUS at 07:39

## 2024-08-28 RX ADMIN — OXYCODONE HYDROCHLORIDE AND ACETAMINOPHEN 1 TABLET: 5; 325 TABLET ORAL at 22:54

## 2024-08-28 RX ADMIN — HEPARIN SODIUM 5000 UNITS: 5000 INJECTION INTRAVENOUS; SUBCUTANEOUS at 21:42

## 2024-08-28 RX ADMIN — FENTANYL CITRATE 25 MCG: 50 INJECTION INTRAMUSCULAR; INTRAVENOUS at 07:39

## 2024-08-28 RX ADMIN — PROPOFOL 30 MG: 10 INJECTION, EMULSION INTRAVENOUS at 10:06

## 2024-08-28 RX ADMIN — ONDANSETRON 4 MG: 2 INJECTION INTRAMUSCULAR; INTRAVENOUS at 09:41

## 2024-08-28 RX ADMIN — ROCURONIUM BROMIDE 10 MG: 10 INJECTION, SOLUTION INTRAVENOUS at 09:30

## 2024-08-28 NOTE — ANESTHESIA POSTPROCEDURE EVALUATION
Post-Op Assessment Note    CV Status:  Stable  Pain Score: 0    Pain management: adequate       Mental Status:  Alert and awake   Hydration Status:  Euvolemic   PONV Controlled:  Controlled   Airway Patency:  Patent     Post Op Vitals Reviewed: Yes    No anethesia notable event occurred.    Staff: CRNA               BP   112/64   Temp   36.7   Pulse  84   Resp 85   SpO2 97% 6L FM

## 2024-08-28 NOTE — ANESTHESIA PREPROCEDURE EVALUATION
Procedure:  INSERTION PROSTHESIS PENILE (Penis)    Relevant Problems   CARDIO   (+) HTN (hypertension)      GI/HEPATIC   (+) GERD (gastroesophageal reflux disease)      /RENAL   (+) BPH with obstruction/lower urinary tract symptoms   (+) Prostatic disorder      PULMONARY   (+) Asthma   (+) Sleep apnea      Other   (+) CPAP (continuous positive airway pressure) dependence   (+) Obesity        Physical Exam    Airway    Mallampati score: I  TM Distance: >3 FB  Neck ROM: full     Dental   No notable dental hx     Cardiovascular  Cardiovascular exam normal    Pulmonary  Pulmonary exam normal     Other Findings        Anesthesia Plan  ASA Score- 2     Anesthesia Type- general with ASA Monitors.         Additional Monitors:     Airway Plan: LMA.           Plan Factors-Exercise tolerance (METS): >4 METS.    Chart reviewed.   Existing labs reviewed. Patient summary reviewed.    Patient is not a current smoker.      Obstructive sleep apnea risk education given perioperatively.        Induction- intravenous.    Postoperative Plan- Plan for postoperative opioid use.     Perioperative Resuscitation Plan - Level 1 - Full Code.       Informed Consent- Anesthetic plan and risks discussed with patient.

## 2024-08-28 NOTE — OP NOTE
OPERATIVE REPORT  PATIENT NAME: Tonio Castro    :  1950  MRN: [id number]  Pt Location: [facility] OR ROOM 03    SURGERY DATE: 2024    Surgeons and Role:     * Luca Mohr MD - Primary     * Mark Law PA-C - Assisting-no general surgical or urologic resident or qualified attending available for assistance therefore PA was requested    Preop Diagnosis:  Erectile dysfunction, unspecified erectile dysfunction type [N52.9]    Post-Op Diagnosis Codes:     * Erectile dysfunction, unspecified erectile dysfunction type [N52.9]    Procedure(s):  INSERTION PROSTHESIS PENILE    Specimen(s):  * No specimens in log *    Estimated Blood Loss:   Minimal    Drains:  Urethral Catheter Latex;Double-lumen 16 Fr. (Active)   Number of days: 0       Anesthesia Type:   General    Operative Indications:  Erectile dysfunction, unspecified erectile dysfunction type [N52.9]       Operative Findings:  See operative note below      Complications:   None    Procedure and Technique:  I saw the patient in the holding room and again reviewed the procedure step-by-step as proposed answered all patient questions performed history and physical discussed risks and complications as per history and physical.  The patient expressed understanding reaffirming previously signed informed verbal consent also confirming that he took Cipro for 1 week prior to the procedure as well as Diflucan for 100 mg last night.  The patient was given intravenous Unasyn 3 g prior to the procedure taken to the operating room identified by the surgeon and given an extended penile implant prep on the table in the supine position after general LMA anesthesia was induced and appropriate timeout took place.  Throughout the case strict sterile technique was adhered to with no breaks in technique.  Ancef and gentamicin irrigation was used throughout the procedure and copious amounts in the surgeon and assistant wore 3 pairs of gloves and after insertion of the Perea  and contact with the skin the outer pair of gloves were taken off and from that point on there is limited skin contact and maximal skin coverage.  After anesthetic induction prepping and draping an appropriate timeout a 16 Khmer Perea catheter was inserted per urethra into the urinary bladder and the bladder emptied of clear urine.  10 cc of saline was placed in the Perea balloon.  At this point using the Hernando Anthony retractor for retraction a 15 scalpel blade was used to make a midline penoscrotal anterior scrotal incision approximately 2 inches long with skin edges retracted using Jakub Anthony hooks.  Sharp dissection and blunt dissection in both the midline and left and right gutters alongside the penoscrotal angle took place allowing exposure of the ventral aspects of the corpora cavernosum identifying pearly white tunica albuginea bilaterally.  Throughout the procedure there is no injury to the urethra and the urethra was easily demarcated by the Perea catheter.  2-0 Vicryl stay sutures were placed deep to the peno scrotal junction medially and laterally in each of the left and right corpora and fresh sterile 15 scalpel blade was used to make ventral longitudinal corporotomies approximately 1-1/2 cm in length.  Dilation of the proximal and distal aspects of the left and right corpora took place using a Martinez dilator at 14 mm and the Rigo tool was used to measure total corporal length which was 20 cm.  Using an Sumomi CX 18 cm cylinder set with 2 cm rear-tip extenders bilaterally lead sutures were attached to a Eber needle and fired using the Rigo tool through the distal aspect of the corpora cavernosum bilaterally at the mid gland position.  This allowed for placement of the cylinders and rear-tip extenders bilaterally into the corpora without any abnormality.  Corporotomies were then closed using 2-0 Vicryl stay sutures and additional 2-0 Vicryl sutures.  At this point attention was turned to the  right superficial inguinal ring.  Using a [place] retractor the scrotal incision was retracted up to the level of the superficial inguinal ring on the right and using blunt and sharp dissection transversalis fascia was perforated into the space of Retzius being careful to hug the pubic bone and avoid any underlying structures.  A nasal speculum was then placed through the opening and the transversalis fascia into the space of Retzius and a 100 cc conceal reservoir was placed into the space of Retzius and loaded with 100 cc of normal saline.  Tubing was trimmed and then the tubing from the reservoir was connected to the pump tubing.  Blunt and sharp dissection in the midline of the scrotum allowed for placement of a subdartos pouch into which the scrotal pump was placed.  Inflation and deflation of the implant yielded an excellent straight rigid erection with deflation providing adequate flaccidity.  At this point a Jaycob-Hall drain was placed through a separate stab incision in the left side of the scrotum and affixed to the skin using 3-0 nylon suture.  The drain was placed to self suction.  With the scrotal pump in good position dependently in the midline between hemiscrotum dartos fascia was then closed using a running 2-0 Vicryl suture and skin was closed using a 4-0 Monocryl subcuticular suture.  Sterile compression dressings were then applied the Perea catheter was left to straight drainage which was totally clear without any sign of gross hematuria.  The patient was extubated and transferred to the PACU in good condition after the sponge needle and instrument counts were reported to me as being correct.  There were no complications.  The patient did well   I was present for the entire procedure. and I was present for all critical portions of the procedure.    Patient Disposition:  PACU         SIGNATURE: Luca Mohr MD  DATE: August 28, 2024  TIME: 9:41 AM

## 2024-08-28 NOTE — INTERVAL H&P NOTE
H&P reviewed. After examining the patient I find no changes in the patients condition since the H&P had been written.    Vitals:    08/28/24 0555   BP: 143/91   Pulse: 86   Resp: 16   Temp: 97.8 °F (36.6 °C)   SpO2: 95%

## 2024-08-28 NOTE — PLAN OF CARE
Problem: Nutrition/Hydration-ADULT  Goal: Nutrient/Hydration intake appropriate for improving, restoring or maintaining nutritional needs  Description: Monitor and assess patient's nutrition/hydration status for malnutrition. Collaborate with interdisciplinary team and initiate plan and interventions as ordered.  Monitor patient's weight and dietary intake as ordered or per policy. Utilize nutrition screening tool and intervene as necessary. Determine patient's food preferences and provide high-protein, high-caloric foods as appropriate.     INTERVENTIONS:  - Monitor oral intake, urinary output, labs, and treatment plans  - Assess nutrition and hydration status and recommend course of action  - Evaluate amount of meals eaten  - Assist patient with eating if necessary   - Allow adequate time for meals  - Recommend/ encourage appropriate diets, oral nutritional supplements, and vitamin/mineral supplements  - Order, calculate, and assess calorie counts as needed  - Recommend, monitor, and adjust tube feedings and TPN/PPN based on assessed needs  - Assess need for intravenous fluids  - Provide specific nutrition/hydration education as appropriate  - Include patient/family/caregiver in decisions related to nutrition  Outcome: Progressing     Problem: PAIN - ADULT  Goal: Verbalizes/displays adequate comfort level or baseline comfort level  Description: Interventions:  - Encourage patient to monitor pain and request assistance  - Assess pain using appropriate pain scale  - Administer analgesics based on type and severity of pain and evaluate response  - Implement non-pharmacological measures as appropriate and evaluate response  - Consider cultural and social influences on pain and pain management  - Notify physician/advanced practitioner if interventions unsuccessful or patient reports new pain  Outcome: Progressing     Problem: INFECTION - ADULT  Goal: Absence or prevention of progression during  hospitalization  Description: INTERVENTIONS:  - Assess and monitor for signs and symptoms of infection  - Monitor lab/diagnostic results  - Monitor all insertion sites, i.e. indwelling lines, tubes, and drains  - Monitor endotracheal if appropriate and nasal secretions for changes in amount and color  - [place] appropriate cooling/warming therapies per order  - Administer medications as ordered  - Instruct and encourage patient and family to use good hand hygiene technique  - Identify and instruct in appropriate isolation precautions for identified infection/condition  Outcome: Progressing  Goal: Absence of fever/infection during neutropenic period  Description: INTERVENTIONS:  - Monitor WBC    Outcome: Progressing     Problem: SAFETY ADULT  Goal: Patient will remain free of falls  Description: INTERVENTIONS:  - Educate patient/family on patient safety including physical limitations  - Instruct patient to call for assistance with activity   - Consult OT/PT to assist with strengthening/mobility   - Keep Call bell within reach  - Keep bed low and locked with side rails adjusted as appropriate  - Keep care items and personal belongings within reach  - Initiate and maintain comfort rounds  - Make Fall Risk Sign visible to staff  - Apply yellow socks and bracelet for high fall risk patients  - Consider moving patient to room near nurses station  Outcome: Progressing  Goal: Maintain or return to baseline ADL function  Description: INTERVENTIONS:  -  Assess patient's ability to carry out ADLs; assess patient's baseline for ADL function and identify physical deficits which impact ability to perform ADLs (bathing, care of mouth/teeth, toileting, grooming, dressing, etc.)  - Assess/evaluate cause of self-care deficits   - Assess range of motion  - Assess patient's mobility; develop plan if impaired  - Assess patient's need for assistive devices and provide as appropriate  - Encourage maximum independence but intervene and  supervise when necessary  - Involve family in performance of ADLs  - Assess for home care needs following discharge   - Consider OT consult to assist with ADL evaluation and planning for discharge  - Provide patient education as appropriate  Outcome: Progressing  Goal: Maintains/Returns to pre admission functional level  Description: INTERVENTIONS:  - Perform AM-PAC 6 Click Basic Mobility/ Daily Activity assessment daily.  - Set and communicate daily mobility goal to care team and patient/family/caregiver.   - Collaborate with rehabilitation services on mobility goals if consulted  - Out of bed for toileting  - Record patient progress and toleration of activity level   Outcome: Progressing     Problem: DISCHARGE PLANNING  Goal: Discharge to home or other facility with appropriate resources  Description: INTERVENTIONS:  - Identify barriers to discharge w/patient and caregiver  - Arrange for needed discharge resources and transportation as appropriate  - Identify discharge learning needs (meds, wound care, etc.)  - Arrange for interpretive services to assist at discharge as needed  - Refer to Case Management Department for coordinating discharge planning if the patient needs post-hospital services based on physician/advanced practitioner order or complex needs related to functional status, cognitive ability, or social support system  Outcome: Progressing     Problem: Knowledge Deficit  Goal: Patient/family/caregiver demonstrates understanding of disease process, treatment plan, medications, and discharge instructions  Description: Complete learning assessment and assess knowledge base.  Interventions:  - Provide teaching at level of understanding  - Provide teaching via preferred learning methods  Outcome: Progressing

## 2024-08-29 ENCOUNTER — TELEPHONE (OUTPATIENT)
Dept: UROLOGY | Facility: MEDICAL CENTER | Age: 74
End: 2024-08-29

## 2024-08-29 VITALS
HEIGHT: 69 IN | BODY MASS INDEX: 31.87 KG/M2 | OXYGEN SATURATION: 92 % | SYSTOLIC BLOOD PRESSURE: 117 MMHG | DIASTOLIC BLOOD PRESSURE: 75 MMHG | WEIGHT: 215.17 LBS | HEART RATE: 92 BPM | TEMPERATURE: 98 F | RESPIRATION RATE: 22 BRPM

## 2024-08-29 LAB
GLUCOSE SERPL-MCNC: 117 MG/DL (ref 65–140)
GLUCOSE SERPL-MCNC: 139 MG/DL (ref 65–140)

## 2024-08-29 PROCEDURE — 82948 REAGENT STRIP/BLOOD GLUCOSE: CPT

## 2024-08-29 PROCEDURE — 99024 POSTOP FOLLOW-UP VISIT: CPT

## 2024-08-29 PROCEDURE — NC001 PR NO CHARGE

## 2024-08-29 RX ADMIN — CIPROFLOXACIN 500 MG: 500 TABLET, FILM COATED ORAL at 09:41

## 2024-08-29 RX ADMIN — PANTOPRAZOLE SODIUM 40 MG: 40 TABLET, DELAYED RELEASE ORAL at 05:17

## 2024-08-29 RX ADMIN — HEPARIN SODIUM 5000 UNITS: 5000 INJECTION INTRAVENOUS; SUBCUTANEOUS at 09:41

## 2024-08-29 RX ADMIN — OXYCODONE HYDROCHLORIDE AND ACETAMINOPHEN 1 TABLET: 5; 325 TABLET ORAL at 13:40

## 2024-08-29 RX ADMIN — DEXTROSE, SODIUM CHLORIDE, AND POTASSIUM CHLORIDE 75 ML/HR: 5; .45; .15 INJECTION INTRAVENOUS at 05:18

## 2024-08-29 RX ADMIN — AMLODIPINE BESYLATE 2.5 MG: 2.5 TABLET ORAL at 09:41

## 2024-08-29 RX ADMIN — FLUTICASONE PROPIONATE 2 SPRAY: 50 SPRAY, METERED NASAL at 09:41

## 2024-08-29 NOTE — TELEPHONE ENCOUNTER
Post Op Note    Tonio Castro is a 74 y.o. male s/p  INSERTION PROSTHESIS PENILE (Penis) performed 8/28/24 with Dr. Mohr.      How would you rate your pain on a scale from 1 to 10, 10 being the worst pain ever? 0  Have you had a fever? No    Have your bowel movements been regular? No pt will start stool softeners with good hydration   Do you have any difficulty urinating? No  If the patient has an incision- do you have any redness around the incision or any drainage from the incision? Pt's dressing still in place  If the patient has a drain- are you having any issues with the drain? No drain to be removed tomorrow  If the patient has a mckeon- are you comfortable caring for your mckeon? No mckeon.  Removed prior to discharge.   Do you have any other questions or concerns that I can address at this time?     Pt will be seen tomorrow for drain removal.  Wound check appt scheduled for 9/5/24.  Appt for IPP teaching in 6 weeks will be scheduled at this appt

## 2024-08-29 NOTE — DISCHARGE INSTR - AVS FIRST PAGE
Postoperative instructions after penile implant surgery    Medications   - ciprofloxacin (7 days) antibiotics  - percocet (strong pain medication) only AS NEEDED  - tylenol for mild/moderate pain    Dressing/incisional care  You have a drain coming out of your groin area. Return to office tomorrow 8/30 for removal     After surgery your scrotum will be bruised and swollen, perhaps more than you have ever seen it or imagined it!  This is normal for this kind of surgery and it should gradually improve over the next few weeks.  Apply a covered ice pack (reusing frozen peas/corn also works great) intermittently, for 20 minutes at a time, every hour during the day for the first 24 - 48 hours.     After you remove your dressing, you will see an incision on your scrotum. This incision is closed with dissolvable stitches. Do not pick at them. They will fall off on their own.     You may take your first shower 48 hours after surgery. When you shower, do not scrub your incisions. You can let soapy water run over the incisions. Dab dry with towel afterwards.     Wear snug, Jockey type briefs, or bicycle shorts, at all times (even when sleeping). Please do not stand for more than 30 minutes at a time, and avoid climbing stairs as much as possible for the first 72 hours after surgery, in an effort to prevent scrotal swelling and bruising.       It is helpful that your penis be maintained in a straight position, pointing towards the umbilicus (belly button), lying on your abdomen, until your follow up appointment. A certain amount of fluid is purposely left in the implant cylinders.  It is normal to feel like a partial erection is present.     Return to office in 1 week for wound check (9/5/24)    Diet    You may resume your regular diet when you go home.    You may feel some nausea for a few days after surgery due to anesthesia clearing out of your system. It is best to eat lighter meals for the first few days after  surgery.    Physical activity    Do not lift anything heavier than gallon of milk for the first four weeks after surgery.    No vigorous physical activity such as running until you are seen in the office and cleared by your doctor. You may take walks. You may climb stairs.    No sexual activity is permitted until discussed during your follow up appointment at 4-6 weeks.  Please let us know before that if the swelling is increasing instead of decreasing, or if the wound is opened or draining.    You should NOT pump or inflate your implant until you see us in the office. However, three times per day until you see us in the office, you should gently grasp your pump in the scrotum and gently pull it down toward bottom of scrotum. Hold for a few seconds. You should not pull down hard to do this. Doing this action helps the pump settle into a nice position toward the bottom of the scrotum so that it is easier to access.    Follow-up and reasons to call    Please call the office or go to the ED if you experience and concerning symptoms including but not limited to: fevers, chills, nausea, vomiting, worsening abdominal/flank pain, inability to urinate, passage of large blood clots in urine, opening of incision, excessive bleeding from incision, purulent drainage from incision.

## 2024-08-29 NOTE — PROGRESS NOTES
Progress Note - Urology  Tonio Castro 1950, 74 y.o. male MRN: [id number]    Unit/Bed#: E5 -01 Encounter: [id number]      * Erectile dysfunction due to diseases classified elsewhere  Assessment & Plan  POD #1 s/p three-part IPP insertion by Dr. Mohr  VSS, afebrile  Pain well-controlled  Diet advanced  Perea catheter removed this morning per nursing staff, ensure patient can urinate independently prior to discharge  Ecchymosis diffusely throughout the scrotum, minimal pain, minimal bleeding noted on gauze, scrotal incisions well-approximated - no signs of infection  Scrotal drain with 40 cc dark red output overnight, maintained at discharge  Return to the office for scrotal drain removal with nursing staff tomorrow (8/30/2024)  Wound check scheduled for 9/5/2024 in office  Continue conservative measures including scrotal elevation, tight fitting underwear/jockstrap, ice to the area, NSAIDs for pain  Patient's preop urine culture was positive for infection and he was treated preoperatively  Patient will be sent with ciprofloxacin prophylactically for ongoing infection coverage  Tonio is cleared for discharge      Subjective:   Tonio is a 74-year-old male POD #1 s/p three-part IPP insertion by Dr. Mohr for known erectile dysfunction.  Perea catheter removed at bedside this a.m.  Ensure patient can urinate independently prior to discharge.  Scrotal drain with 40 cc of output over the last 12 hours, will be maintained on discharge.  Return to the office tomorrow for scrotal drain removal with nursing staff.  Patient's pain is overall well-controlled.  Reports minimal discomfort within the groin and perineal region rated a 3/10.  On physical exam, IPP remains deflated.  Ecchymosis noted throughout the scrotum, scrotal incision sites well-approximated with minimal bleeding on the gauze.  Patient denies chest pain, shortness of breath, dizziness, or fever/chills.  Diet advanced, denies nausea/vomiting.  " Follow-up wound check scheduled for 9/5/2024.  Patient cleared for discharge.    Review of Systems   Constitutional:  Negative for activity change, chills, fatigue and fever.   Respiratory:  Negative for apnea, cough and shortness of breath.    Cardiovascular:  Negative for chest pain and leg swelling.   Gastrointestinal:  Negative for abdominal distention, abdominal pain, constipation, diarrhea, nausea and vomiting.   Genitourinary:  Positive for penile pain, scrotal swelling (mild, eccymosis) and testicular pain (minimal). Negative for decreased urine volume, difficulty urinating, dysuria, flank pain, frequency, hematuria and urgency.   Neurological:  Negative for dizziness and headaches.   Psychiatric/Behavioral: Negative.         Objective:  Vitals: Blood pressure 117/75, pulse 92, temperature 98 °F (36.7 °C), temperature source Oral, resp. rate 22, height 5' 9\" (1.753 m), weight 97.6 kg (215 lb 2.7 oz), SpO2 92%.,Body mass index is 31.77 kg/m².    Intake/Output Summary (Last 24 hours) at 8/29/2024 1152  Last data filed at 8/29/2024 0852  Gross per 24 hour   Intake 540 ml   Output 1665 ml   Net -1125 ml     Invasive Devices       Peripheral Intravenous Line  Duration             Peripheral IV 08/28/24 Dorsal (posterior);Right Hand 1 day              Drain  Duration             Closed/Suction Drain Left Other (Comment) Bulb 15 Fr. 1 day    Urethral Catheter Latex;Double-lumen 16 Fr. 1 day                    Physical Exam  Vitals and nursing note reviewed.   Constitutional:       Appearance: Normal appearance. He is obese.   HENT:      Head: Normocephalic and atraumatic.      Mouth/Throat:      Pharynx: Oropharynx is clear.   Eyes:      Extraocular Movements: Extraocular movements intact.      Conjunctiva/sclera: Conjunctivae normal.   Cardiovascular:      Rate and Rhythm: Normal rate.   Pulmonary:      Effort: Pulmonary effort is normal.   Abdominal:      General: Abdomen is flat. There is no distension.      " "Palpations: Abdomen is soft.      Tenderness: There is no abdominal tenderness.   Genitourinary:     Comments: Circumcised penis, normal phallus, orthotopic patent meatus.  IPP is deflated, cylinders correctly placed. Ecchymosis throughout the scrotum with mild tenderness radiating into the groins. Minimal bleeding at the surgical site, sutures are well approximated.     Perea removed at bedside - await independent urination     Musculoskeletal:         General: Normal range of motion.      Cervical back: Normal range of motion.   Skin:     General: Skin is warm and dry.   Neurological:      General: No focal deficit present.      Mental Status: He is alert and oriented to person, place, and time.   Psychiatric:         Mood and Affect: Mood normal.         Behavior: Behavior normal.         Labs:  No results for input(s): \"WBC\" in the last 72 hours.  No results for input(s): \"HGB\" in the last 72 hours.  No results for input(s): \"CREATININE\" in the last 72 hours.    History:    Past Medical History:   Diagnosis Date    Arthritis     BPH with obstruction/lower urinary tract symptoms     CPAP (continuous positive airway pressure) dependence     Enlarged prostate     Erectile dysfunction     Feeling of incomplete bladder emptying     GERD (gastroesophageal reflux disease)     Hyperlipidemia     Hypertension     Microscopic hematuria     Poor urinary stream     Shortness of breath     Sleep apnea     Urgency of urination      Past Surgical History:   Procedure Laterality Date    COLONOSCOPY      JOINT REPLACEMENT Bilateral     knees    MN INSJ MULTI-COMPONENT INFLATABLE PENILE PROSTH N/A 8/28/2024    Procedure: INSERTION PROSTHESIS PENILE;  Surgeon: Luca Mohr MD;  Location: [facility];  Service: Urology    MN LASER VAPORIZATION OF PROSTATE FOR URINE FLOW N/A 12/1/2022    Procedure: TRANSURETHRAL RESECTION OF PROSTATE W/ GREEN LIGHT LASER;  Surgeon: Norman Ordonez MD;  Location: [facility];  Service: Urology "    MO PROSTATE NEEDLE BIOPSY ANY APPROACH N/A 10/13/2020    Procedure: TRANSPERINEAL NEEDLE BIOPSY PROSTATE (TRNBP);  Surgeon: Andrew Burden MD;  Location: [facility];  Service: Urology     History reviewed. No pertinent family history.  Social History     Socioeconomic History    Marital status:      Spouse name: None    Number of children: None    Years of education: None    Highest education level: None   Occupational History    None   Tobacco Use    Smoking status: Former    Smokeless tobacco: Never   Vaping Use    Vaping status: Never Used   Substance and Sexual Activity    Alcohol use: Yes     Alcohol/week: 2.0 standard drinks of alcohol     Types: 2 Cans of beer per week     Comment: rare    Drug use: No    Sexual activity: Not Currently   Other Topics Concern    None   Social History Narrative    None     Social Determinants of Health     Financial Resource Strain: Low Risk  (11/17/2023)    [facility]    Overall Financial Resource Strain (CARDIA)     Difficulty of Paying Living Expenses: Not hard at all   Food Insecurity: No Food Insecurity (8/28/2024)    Hunger Vital Sign     Worried About Running Out of Food in the Last Year: Never true     Ran Out of Food in the Last Year: Never true   Transportation Needs: No Transportation Needs (8/28/2024)    PRAPARE - Transportation     Lack of Transportation (Medical): No     Lack of Transportation (Non-Medical): No   Physical Activity: Inactive (11/17/2023)    Received from [facility]    Exercise Vital Sign     Days of Exercise per Week: 2 days     Minutes of Exercise per Session: 0 min   Stress: No Stress Concern Present (11/17/2023)    [facility] from [facility], [facility]    [employer] - Occupational Stress Questionnaire     Feeling of Stress : Not at all   Social Connections: Moderately Integrated  (11/17/2023)    [facility]    Social Connection and Isolation Panel [NHANES]     Frequency of Communication with Friends and Family: More than three times a week     Frequency of Social Gatherings with Friends and Family: Twice a week     Attends Zoroastrianism Services: More than 4 times per year     Active Member of Clubs or Organizations: Yes     Attends Club or Organization Meetings: More than 4 times per year     Marital Status:    Intimate Partner Violence: Not At Risk (11/17/2023)    Received from [facility], [facility]    Humiliation, Afraid, Rape, and Kick questionnaire     Fear of Current or Ex-Partner: No     Emotionally Abused: No     Physically Abused: No     Sexually Abused: No   Housing Stability: Low Risk  (8/28/2024)    Housing Stability Vital Sign     Unable to Pay for Housing in the Last Year: No     Number of Times Moved in the Last Year: 0     Homeless in the Last Year: No         Kailee Hartman PA-C  Date: 8/29/2024 Time: 11:52 AM

## 2024-08-29 NOTE — ASSESSMENT & PLAN NOTE
POD #1 s/p three-part IPP insertion by Dr. Mohr  VSS, afebrile  Pain well-controlled  Diet advanced  Perea catheter removed this morning per nursing staff, ensure patient can urinate independently prior to discharge  Ecchymosis diffusely throughout the scrotum, minimal pain, minimal bleeding noted on gauze, scrotal incisions well-approximated - no signs of infection  Scrotal drain with 40 cc dark red output overnight, maintained at discharge  Return to the office for scrotal drain removal with nursing staff tomorrow (8/30/2024)  Wound check scheduled for 9/5/2024 in office  Continue conservative measures including scrotal elevation, tight fitting underwear/jockstrap, ice to the area, NSAIDs for pain  Patient's preop urine culture was positive for infection and he was treated preoperatively  Patient will be sent with ciprofloxacin prophylactically for ongoing infection coverage  Tonio is cleared for discharge

## 2024-08-29 NOTE — DISCHARGE SUMMARY
Discharge Summary - Tonio Castro 74 y.o. male MRN: [id number]    Unit/Bed#: E5 -01 Encounter: [id number]    Admission Date: 8/28/2024     Discharge Date: 08/29/24    HPI: Tonio Castro is a 74 y.o. male who presented for 3-part IPP insertion by Dr. Mohr.      Procedure(s):  INSERTION PROSTHESIS PENILE  Surgeon(s):  JANETH Blanchard MD  8/28/2024    Hospital Course: Tonio is POD #1 s/p three-part IPP insertion by Dr. Mohr for known erectile dysfunction.  No intraoperative complications reported.  The patient was transported the PACU in stable condition.  He was monitored overnight, no complications reported.  Patient's pain is overall well-controlled.  He is ambulating independently.  Diet advanced, denies nausea/vomiting.  Perea catheter removed this morning at bedside.  Patient urinated 100 mL of clear yellow urine independently.  Scrotal drain with 40 cc of dark red output over the last 12 hours, maintain at discharge.  Return to the office tomorrow (8/30) for scrotal drain removal.  Postoperative recommendations discussed with the patient.  Office follow-up for wound check on 9/5/2024.  The patient is cleared for discharge.    Discharge Diagnosis: Erectile dysfunction due to diseases classified elsewhere    Condition at Discharge: good    Discharge Medications:  See after visit summary for reconciled discharge medications provided to patient and family.  Patient was discharged home on home medications with the addition of ciprofloxacin (antibiotic), Percocet as needed for severe pain.    Discharge instructions/Information to patient and family:   See after visit summary for written and verbal information which has been provided to patient and family.      Provisions for Follow-Up Care:  See after visit summary for information related to follow-up care and any pertinent home health orders.      Disposition: Home    Planned Readmission: No    Discharge Statement   I spent 15  minutes discharging the patient. This time was spent on the day of discharge. I had direct contact with the patient on the day of discharge. Additional documentation is required if more than 30 minutes were spent on discharge.     Signature:   Kailee Hartman PA-C  Date: 8/29/2024 Time: 11:55 AM

## 2024-08-29 NOTE — PLAN OF CARE
Problem: Nutrition/Hydration-ADULT  Goal: Nutrient/Hydration intake appropriate for improving, restoring or maintaining nutritional needs  Description: Monitor and assess patient's nutrition/hydration status for malnutrition. Collaborate with interdisciplinary team and initiate plan and interventions as ordered.  Monitor patient's weight and dietary intake as ordered or per policy. Utilize nutrition screening tool and intervene as necessary. Determine patient's food preferences and provide high-protein, high-caloric foods as appropriate.     INTERVENTIONS:  - Monitor oral intake, urinary output, labs, and treatment plans  - Assess nutrition and hydration status and recommend course of action  - Evaluate amount of meals eaten  - Assist patient with eating if necessary   - Allow adequate time for meals  - Recommend/ encourage appropriate diets, oral nutritional supplements, and vitamin/mineral supplements  - Order, calculate, and assess calorie counts as needed  - Recommend, monitor, and adjust tube feedings and TPN/PPN based on assessed needs  - Assess need for intravenous fluids  - Provide specific nutrition/hydration education as appropriate  - Include patient/family/caregiver in decisions related to nutrition  Outcome: Progressing     Problem: PAIN - ADULT  Goal: Verbalizes/displays adequate comfort level or baseline comfort level  Description: Interventions:  - Encourage patient to monitor pain and request assistance  - Assess pain using appropriate pain scale  - Administer analgesics based on type and severity of pain and evaluate response  - Implement non-pharmacological measures as appropriate and evaluate response  - Consider cultural and social influences on pain and pain management  - Notify physician/advanced practitioner if interventions unsuccessful or patient reports new pain  Outcome: Progressing     Problem: INFECTION - ADULT  Goal: Absence or prevention of progression during  hospitalization  Description: INTERVENTIONS:  - Assess and monitor for signs and symptoms of infection  - Monitor lab/diagnostic results  - Monitor all insertion sites, i.e. indwelling lines, tubes, and drains  - Monitor endotracheal if appropriate and nasal secretions for changes in amount and color  - [place] appropriate cooling/warming therapies per order  - Administer medications as ordered  - Instruct and encourage patient and family to use good hand hygiene technique  - Identify and instruct in appropriate isolation precautions for identified infection/condition  Outcome: Progressing  Goal: Absence of fever/infection during neutropenic period  Description: INTERVENTIONS:  - Monitor WBC    Outcome: Progressing     Problem: SAFETY ADULT  Goal: Patient will remain free of falls  Description: INTERVENTIONS:  - Educate patient/family on patient safety including physical limitations  - Instruct patient to call for assistance with activity   - Consult OT/PT to assist with strengthening/mobility   - Keep Call bell within reach  - Keep bed low and locked with side rails adjusted as appropriate  - Keep care items and personal belongings within reach  - Initiate and maintain comfort rounds  - Make Fall Risk Sign visible to staff  - Apply yellow socks and bracelet for high fall risk patients  - Consider moving patient to room near nurses station  Outcome: Progressing  Goal: Maintain or return to baseline ADL function  Description: INTERVENTIONS:  -  Assess patient's ability to carry out ADLs; assess patient's baseline for ADL function and identify physical deficits which impact ability to perform ADLs (bathing, care of mouth/teeth, toileting, grooming, dressing, etc.)  - Assess/evaluate cause of self-care deficits   - Assess range of motion  - Assess patient's mobility; develop plan if impaired  - Assess patient's need for assistive devices and provide as appropriate  - Encourage maximum independence but intervene and  supervise when necessary  - Involve family in performance of ADLs  - Assess for home care needs following discharge   - Consider OT consult to assist with ADL evaluation and planning for discharge  - Provide patient education as appropriate  Outcome: Progressing  Goal: Maintains/Returns to pre admission functional level  Description: INTERVENTIONS:  - Perform AM-PAC 6 Click Basic Mobility/ Daily Activity assessment daily.  - Set and communicate daily mobility goal to care team and patient/family/caregiver.   - Collaborate with rehabilitation services on mobility goals if consulted  - Out of bed for toileting  - Record patient progress and toleration of activity level   Outcome: Progressing     Problem: DISCHARGE PLANNING  Goal: Discharge to home or other facility with appropriate resources  Description: INTERVENTIONS:  - Identify barriers to discharge w/patient and caregiver  - Arrange for needed discharge resources and transportation as appropriate  - Identify discharge learning needs (meds, wound care, etc.)  - Arrange for interpretive services to assist at discharge as needed  - Refer to Case Management Department for coordinating discharge planning if the patient needs post-hospital services based on physician/advanced practitioner order or complex needs related to functional status, cognitive ability, or social support system  Outcome: Progressing     Problem: Knowledge Deficit  Goal: Patient/family/caregiver demonstrates understanding of disease process, treatment plan, medications, and discharge instructions  Description: Complete learning assessment and assess knowledge base.  Interventions:  - Provide teaching at level of understanding  - Provide teaching via preferred learning methods  Outcome: Progressing

## 2024-08-29 NOTE — NURSING NOTE
Discharge instructions reviewed with pt and significant other at bedside. Pt assisted with placement of a jock strap prior to leaving. Pt is aware of follow-up appointments and medications to be picked up. Pt has no questions or concerns. Pt and SO educated on care of RAQUEL drain and have no issues. SO will give pt a ride home.

## 2024-08-29 NOTE — PLAN OF CARE
Problem: Nutrition/Hydration-ADULT  Goal: Nutrient/Hydration intake appropriate for improving, restoring or maintaining nutritional needs  Description: Monitor and assess patient's nutrition/hydration status for malnutrition. Collaborate with interdisciplinary team and initiate plan and interventions as ordered.  Monitor patient's weight and dietary intake as ordered or per policy. Utilize nutrition screening tool and intervene as necessary. Determine patient's food preferences and provide high-protein, high-caloric foods as appropriate.     INTERVENTIONS:  - Monitor oral intake, urinary output, labs, and treatment plans  - Assess nutrition and hydration status and recommend course of action  - Evaluate amount of meals eaten  - Assist patient with eating if necessary   - Allow adequate time for meals  - Recommend/ encourage appropriate diets, oral nutritional supplements, and vitamin/mineral supplements  - Order, calculate, and assess calorie counts as needed  - Recommend, monitor, and adjust tube feedings and TPN/PPN based on assessed needs  - Assess need for intravenous fluids  - Provide specific nutrition/hydration education as appropriate  - Include patient/family/caregiver in decisions related to nutrition  Outcome: Progressing     Problem: PAIN - ADULT  Goal: Verbalizes/displays adequate comfort level or baseline comfort level  Description: Interventions:  - Encourage patient to monitor pain and request assistance  - Assess pain using appropriate pain scale  - Administer analgesics based on type and severity of pain and evaluate response  - Implement non-pharmacological measures as appropriate and evaluate response  - Consider cultural and social influences on pain and pain management  - Notify physician/advanced practitioner if interventions unsuccessful or patient reports new pain  Outcome: Progressing     Problem: INFECTION - ADULT  Goal: Absence or prevention of progression during  hospitalization  Description: INTERVENTIONS:  - Assess and monitor for signs and symptoms of infection  - Monitor lab/diagnostic results  - Monitor all insertion sites, i.e. indwelling lines, tubes, and drains  - Monitor endotracheal if appropriate and nasal secretions for changes in amount and color  - [place] appropriate cooling/warming therapies per order  - Administer medications as ordered  - Instruct and encourage patient and family to use good hand hygiene technique  - Identify and instruct in appropriate isolation precautions for identified infection/condition  Outcome: Progressing  Goal: Absence of fever/infection during neutropenic period  Description: INTERVENTIONS:  - Monitor WBC    Outcome: Progressing     Problem: SAFETY ADULT  Goal: Patient will remain free of falls  Description: INTERVENTIONS:  - Educate patient/family on patient safety including physical limitations  - Instruct patient to call for assistance with activity   - Consult OT/PT to assist with strengthening/mobility   - Keep Call bell within reach  - Keep bed low and locked with side rails adjusted as appropriate  - Keep care items and personal belongings within reach  - Initiate and maintain comfort rounds  - Make Fall Risk Sign visible to staff  - Apply yellow socks and bracelet for high fall risk patients  - Consider moving patient to room near nurses station  Outcome: Progressing  Goal: Maintain or return to baseline ADL function  Description: INTERVENTIONS:  -  Assess patient's ability to carry out ADLs; assess patient's baseline for ADL function and identify physical deficits which impact ability to perform ADLs (bathing, care of mouth/teeth, toileting, grooming, dressing, etc.)  - Assess/evaluate cause of self-care deficits   - Assess range of motion  - Assess patient's mobility; develop plan if impaired  - Assess patient's need for assistive devices and provide as appropriate  - Encourage maximum independence but intervene and  supervise when necessary  - Involve family in performance of ADLs  - Assess for home care needs following discharge   - Consider OT consult to assist with ADL evaluation and planning for discharge  - Provide patient education as appropriate  Outcome: Progressing  Goal: Maintains/Returns to pre admission functional level  Description: INTERVENTIONS:  - Perform AM-PAC 6 Click Basic Mobility/ Daily Activity assessment daily.  - Set and communicate daily mobility goal to care team and patient/family/caregiver.   - Collaborate with rehabilitation services on mobility goals if consulted  - Out of bed for toileting  - Record patient progress and toleration of activity level   Outcome: Progressing     Problem: DISCHARGE PLANNING  Goal: Discharge to home or other facility with appropriate resources  Description: INTERVENTIONS:  - Identify barriers to discharge w/patient and caregiver  - Arrange for needed discharge resources and transportation as appropriate  - Identify discharge learning needs (meds, wound care, etc.)  - Arrange for interpretive services to assist at discharge as needed  - Refer to Case Management Department for coordinating discharge planning if the patient needs post-hospital services based on physician/advanced practitioner order or complex needs related to functional status, cognitive ability, or social support system  Outcome: Progressing     Problem: Knowledge Deficit  Goal: Patient/family/caregiver demonstrates understanding of disease process, treatment plan, medications, and discharge instructions  Description: Complete learning assessment and assess knowledge base.  Interventions:  - Provide teaching at level of understanding  - Provide teaching via preferred learning methods  Outcome: Progressing

## 2024-08-30 ENCOUNTER — PROCEDURE VISIT (OUTPATIENT)
Dept: UROLOGY | Facility: MEDICAL CENTER | Age: 74
End: 2024-08-30

## 2024-08-30 VITALS
HEART RATE: 120 BPM | WEIGHT: 217 LBS | HEIGHT: 69 IN | BODY MASS INDEX: 32.14 KG/M2 | SYSTOLIC BLOOD PRESSURE: 120 MMHG | DIASTOLIC BLOOD PRESSURE: 68 MMHG

## 2024-08-30 DIAGNOSIS — N52.9 ERECTILE DYSFUNCTION, UNSPECIFIED ERECTILE DYSFUNCTION TYPE: Primary | ICD-10-CM

## 2024-08-30 PROCEDURE — 99024 POSTOP FOLLOW-UP VISIT: CPT

## 2024-08-30 NOTE — PROGRESS NOTES
"8/30/2024    Tonio Castro is a 74 y.o. male  [id number]    Diagnosis:  Chief Complaint    Erectile Dysfunction unspecified         Patient presents for drain removal s/p  INSERTION PROSTHESIS PENILE (Penis)  on 8/28/24 with Dr. Mohr.    Plan:  Return on 9/5/24 for wound check.    Procedure:    Pt states he drained 20 mls since discharge from hospital.  Drain output prior to removal approx 12 mls.  Drain gently removed after cutting of suture.  Site cleansed with Hibiclens and triple antibiotic ointment applied with non adherent gauze square.  Pt will keep area clean and report any excessive drainage.  Pt tolerated well.    Vitals:    08/30/24 1336   BP: 120/68   Pulse: (!) 120   Weight: 98.4 kg (217 lb)   Height: 5' 9\" (1.753 m)         Trina Francis RN   "

## 2024-09-05 ENCOUNTER — OFFICE VISIT (OUTPATIENT)
Dept: UROLOGY | Facility: MEDICAL CENTER | Age: 74
End: 2024-09-05

## 2024-09-05 VITALS
HEIGHT: 69 IN | DIASTOLIC BLOOD PRESSURE: 78 MMHG | SYSTOLIC BLOOD PRESSURE: 124 MMHG | BODY MASS INDEX: 32.14 KG/M2 | WEIGHT: 217 LBS | HEART RATE: 92 BPM | OXYGEN SATURATION: 93 %

## 2024-09-05 DIAGNOSIS — N52.1 ERECTILE DYSFUNCTION DUE TO DISEASES CLASSIFIED ELSEWHERE: Primary | ICD-10-CM

## 2024-09-05 PROCEDURE — 99024 POSTOP FOLLOW-UP VISIT: CPT

## 2024-09-05 NOTE — PROGRESS NOTES
9/5/2024      Assessment and Plan    74 y.o. male managed by Dr. Mohr    Erectile dysfunction due to diseases classified elsewhere  Patient has a history of erectile dysfunction and failure experience benefit on a trial of oral pharmacotherapy and Trimix injections  The patient underwent insertion of a 3 part IPP by Dr. Mohr performed 8/28/2024.  Patient presented today in the office for wound check.  Overall, the patient is doing very well following the procedure and denies any significant pain or discomfort within his scrotal or testicular area.  Testicular and scrotal exam performed today.  Refer to physical exam findings.  Patient will be scheduled for a 6-week teaching session with Dr. Mohr.  Patient will call with any questions or concerns.        History of Present Illness  Tonio Castro is a 74 y.o. male here for evaluation of erectile dysfunction status post 3 part IPP insertion by Dr. Mohr performed 8/28/2024.  Today, the patient is overall doing well following his recent IPP insertion procedure.  Patient denies any significant pain or discomfort around his testicular and scrotal area following the procedure.  Patient denies any fevers, chills, nausea, or vomiting.  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      Review of Systems   Constitutional:  Negative for chills and fever.   HENT:  Negative for ear pain and sore throat.    Eyes:  Negative for pain and visual disturbance.   Respiratory:  Negative for cough and shortness of breath.    Cardiovascular:  Negative for chest pain and palpitations.   Gastrointestinal:  Negative for abdominal pain and vomiting.   Genitourinary:  Negative for decreased urine volume, difficulty urinating, dysuria, frequency, hematuria and urgency.   Musculoskeletal:  Negative for arthralgias and back pain.   Skin:  Negative for color change and rash.   Neurological:  Negative for seizures and  "syncope.   All other systems reviewed and are negative.               Vitals  Vitals:    09/05/24 1002   BP: 124/78   BP Location: Left arm   Patient Position: Sitting   Cuff Size: Standard   Pulse: 92   SpO2: 93%   Weight: 98.4 kg (217 lb)   Height: 5' 9\" (1.753 m)       Physical Exam  Constitutional:       General: He is not in acute distress.     Appearance: Normal appearance. He is not ill-appearing.   HENT:      Head: Normocephalic and atraumatic.      Nose: Nose normal.   Eyes:      General: No scleral icterus.  Pulmonary:      Effort: No respiratory distress.   Abdominal:      General: Abdomen is flat. There is no distension.      Palpations: Abdomen is soft.      Tenderness: There is no abdominal tenderness.   Genitourinary:     Comments: Swollen phallus but as expected only being a week out following his recent IPP procedure.  Incisions intact without drainage or abnormal discharge.  Testicles and scrotum are nonswollen and no pain is noted with tactile pressure.  Testicles descended bilaterally and smooth.  No erythema, calor, or hardness appreciated on physical exam.  Musculoskeletal:         General: Normal range of motion.      Cervical back: Normal range of motion.   Skin:     General: Skin is warm.      Coloration: Skin is not jaundiced.   Neurological:      Mental Status: He is alert and oriented to person, place, and time.      Gait: Gait normal.   Psychiatric:         Mood and Affect: Mood normal.         Behavior: Behavior normal.           Past History  Past Medical History:   Diagnosis Date    Arthritis     BPH with obstruction/lower urinary tract symptoms     CPAP (continuous positive airway pressure) dependence     Enlarged prostate     Erectile dysfunction     Feeling of incomplete bladder emptying     GERD (gastroesophageal reflux disease)     Hyperlipidemia     Hypertension     Microscopic hematuria     Poor urinary stream     Shortness of breath     Sleep apnea     Urgency of urination  "     Social History     Socioeconomic History    Marital status:      Spouse name: None    Number of children: None    Years of education: None    Highest education level: None   Occupational History    None   Tobacco Use    Smoking status: Former    Smokeless tobacco: Never   Vaping Use    Vaping status: Never Used   Substance and Sexual Activity    Alcohol use: Yes     Alcohol/week: 2.0 standard drinks of alcohol     Types: 2 Cans of beer per week     Comment: rare    Drug use: No    Sexual activity: Not Currently   Other Topics Concern    None   Social History Narrative    None     Social Determinants of Health     Financial Resource Strain: Low Risk  (11/17/2023)    [facility]    Overall Financial Resource Strain (CARDIA)     Difficulty of Paying Living Expenses: Not hard at all   Food Insecurity: No Food Insecurity (8/28/2024)    Hunger Vital Sign     Worried About Running Out of Food in the Last Year: Never true     Ran Out of Food in the Last Year: Never true   Transportation Needs: No Transportation Needs (8/28/2024)    PRAPARE - Transportation     Lack of Transportation (Medical): No     Lack of Transportation (Non-Medical): No   Physical Activity: Inactive (11/17/2023)    Received from [facility]    Exercise Vital Sign     Days of Exercise per Week: 2 days     Minutes of Exercise per Session: 0 min   Stress: No Stress Concern Present (11/17/2023)    [facility]    [place] [employer] - Occupational Stress Questionnaire     Feeling of Stress : Not at all   Social Connections: Moderately Integrated (11/17/2023)    [facility]    Social Connection and Isolation Panel [NHANES]     Frequency of Communication with Friends and Family: More than three times a week     Frequency of Social  Gatherings with Friends and Family: Twice a week     Attends Alevism Services: More than 4 times per year     Active Member of Clubs or Organizations: Yes     Attends Club or Organization Meetings: More than 4 times per year     Marital Status:    Intimate Partner Violence: Not At Risk (11/17/2023)    [facility]    Humiliation, Afraid, Rape, and Kick questionnaire     Fear of Current or Ex-Partner: No     Emotionally Abused: No     Physically Abused: No     Sexually Abused: No   Housing Stability: Low Risk  (8/28/2024)    Housing Stability Vital Sign     Unable to Pay for Housing in the Last Year: No     Number of Times Moved in the Last Year: 0     Homeless in the Last Year: No     Social History     Tobacco Use   Smoking Status Former   Smokeless Tobacco Never     History reviewed. No pertinent family history.    The following portions of the patient's history were reviewed and updated as appropriate: allergies, current medications, past medical history, past social history, past surgical history and problem list.    Results  No results found for this or any previous visit (from the past 1 hour(s)).]  Lab Results   Component Value Date    PSA 1.77 12/04/2023    PSA 2.4 11/21/2022    PSA 3.8 04/27/2022    PSA 3.7 10/25/2021     Lab Results   Component Value Date    CALCIUM 8.9 08/20/2024    K 3.7 08/20/2024    CO2 27 08/20/2024     08/20/2024    BUN 28 (H) 08/20/2024    CREATININE 0.86 08/20/2024     Lab Results   Component Value Date    WBC 5.66 08/20/2024    HGB 15.0 08/20/2024    HCT 43.3 08/20/2024    MCV 94 08/20/2024     (L) 08/20/2024

## 2024-09-06 NOTE — ASSESSMENT & PLAN NOTE
Patient has a history of erectile dysfunction and failure experience benefit on a trial of oral pharmacotherapy and Trimix injections  The patient underwent insertion of a 3 part IPP by Dr. Mohr performed 8/28/2024.  Patient presented today in the office for wound check.  Overall, the patient is doing very well following the procedure and denies any significant pain or discomfort within his scrotal or testicular area.  Testicular and scrotal exam performed today.  Refer to physical exam findings.  Patient will be scheduled for a 6-week teaching session with Dr. Mohr.  Patient will call with any questions or concerns.

## 2024-10-01 ENCOUNTER — TELEPHONE (OUTPATIENT)
Dept: UROLOGY | Facility: MEDICAL CENTER | Age: 74
End: 2024-10-01

## 2024-10-10 ENCOUNTER — OFFICE VISIT (OUTPATIENT)
Dept: UROLOGY | Facility: MEDICAL CENTER | Age: 74
End: 2024-10-10

## 2024-10-10 VITALS
WEIGHT: 219 LBS | HEART RATE: 92 BPM | OXYGEN SATURATION: 99 % | SYSTOLIC BLOOD PRESSURE: 132 MMHG | BODY MASS INDEX: 32.44 KG/M2 | HEIGHT: 69 IN | DIASTOLIC BLOOD PRESSURE: 84 MMHG

## 2024-10-10 DIAGNOSIS — N13.8 BPH WITH OBSTRUCTION/LOWER URINARY TRACT SYMPTOMS: ICD-10-CM

## 2024-10-10 DIAGNOSIS — Z96.0 S/P INSERTION OF PENILE IMPLANT: Primary | ICD-10-CM

## 2024-10-10 DIAGNOSIS — N40.1 BPH WITH OBSTRUCTION/LOWER URINARY TRACT SYMPTOMS: ICD-10-CM

## 2024-10-10 PROCEDURE — 99024 POSTOP FOLLOW-UP VISIT: CPT | Performed by: UROLOGY

## 2024-10-10 RX ORDER — BUDESONIDE AND FORMOTEROL FUMARATE DIHYDRATE 160; 4.5 UG/1; UG/1
2 AEROSOL RESPIRATORY (INHALATION) 2 TIMES DAILY
COMMUNITY
Start: 2024-10-04

## 2024-10-10 RX ORDER — GLUCOSAMINE/CHONDR SU A SOD 750-600 MG
TABLET ORAL
COMMUNITY

## 2024-10-10 RX ORDER — TAMSULOSIN HYDROCHLORIDE 0.4 MG/1
0.4 CAPSULE ORAL
Qty: 90 CAPSULE | Refills: 3 | Status: SHIPPED | OUTPATIENT
Start: 2024-10-10 | End: 2025-02-07

## 2024-10-10 RX ORDER — AMOXICILLIN 500 MG/1
CAPSULE ORAL
COMMUNITY
Start: 2024-10-01

## 2024-10-10 NOTE — PROGRESS NOTES
The patient returns to the office approximately 6 weeks after insertion of a 3 part IPP.  He has no complaints and denies any pain.  The patient on physical examination has excellent placement of the penile cylinders with an excellent erect and flaccid result.  The scrotal pump is freely mobile and fully functional.  The scrotum and incision are completely within normal limits.  The patient was taught to inflate and deflate the penile implant as well as his partner.  The implant functioned quite well and there was good understanding of function.  The patient will return in approximately 6 months for KENZIE PSA check and review of voiding history as well as IPP

## 2024-12-04 ENCOUNTER — APPOINTMENT (OUTPATIENT)
Dept: LAB | Facility: CLINIC | Age: 74
End: 2024-12-04
Payer: COMMERCIAL

## 2024-12-04 DIAGNOSIS — Z12.5 PROSTATE CANCER SCREENING: ICD-10-CM

## 2024-12-04 LAB — PSA SERPL-MCNC: 2.53 NG/ML (ref 0–4)

## 2024-12-04 PROCEDURE — 36415 COLL VENOUS BLD VENIPUNCTURE: CPT

## 2024-12-04 PROCEDURE — G0103 PSA SCREENING: HCPCS

## 2025-04-03 ENCOUNTER — APPOINTMENT (OUTPATIENT)
Dept: LAB | Facility: CLINIC | Age: 75
End: 2025-04-03
Payer: COMMERCIAL

## 2025-04-03 DIAGNOSIS — N13.8 BPH WITH OBSTRUCTION/LOWER URINARY TRACT SYMPTOMS: ICD-10-CM

## 2025-04-03 DIAGNOSIS — N40.1 BPH WITH OBSTRUCTION/LOWER URINARY TRACT SYMPTOMS: ICD-10-CM

## 2025-04-03 DIAGNOSIS — Z96.0 S/P INSERTION OF PENILE IMPLANT: ICD-10-CM

## 2025-04-03 LAB
ALBUMIN SERPL BCG-MCNC: 4.3 G/DL (ref 3.5–5)
ALP SERPL-CCNC: 94 U/L (ref 34–104)
ALT SERPL W P-5'-P-CCNC: 28 U/L (ref 7–52)
ANION GAP SERPL CALCULATED.3IONS-SCNC: 10 MMOL/L (ref 4–13)
AST SERPL W P-5'-P-CCNC: 24 U/L (ref 13–39)
BILIRUB SERPL-MCNC: 0.83 MG/DL (ref 0.2–1)
BUN SERPL-MCNC: 21 MG/DL (ref 5–25)
CALCIUM SERPL-MCNC: 9.4 MG/DL (ref 8.4–10.2)
CHLORIDE SERPL-SCNC: 102 MMOL/L (ref 96–108)
CO2 SERPL-SCNC: 30 MMOL/L (ref 21–32)
CREAT SERPL-MCNC: 0.82 MG/DL (ref 0.6–1.3)
GFR SERPL CREATININE-BSD FRML MDRD: 86 ML/MIN/1.73SQ M
GLUCOSE P FAST SERPL-MCNC: 99 MG/DL (ref 65–99)
POTASSIUM SERPL-SCNC: 4 MMOL/L (ref 3.5–5.3)
PROT SERPL-MCNC: 7.2 G/DL (ref 6.4–8.4)
PSA SERPL-MCNC: 1.84 NG/ML (ref 0–4)
SODIUM SERPL-SCNC: 142 MMOL/L (ref 135–147)

## 2025-04-03 PROCEDURE — 36415 COLL VENOUS BLD VENIPUNCTURE: CPT

## 2025-04-03 PROCEDURE — 84153 ASSAY OF PSA TOTAL: CPT

## 2025-04-03 PROCEDURE — 80053 COMPREHEN METABOLIC PANEL: CPT

## 2025-04-18 ENCOUNTER — OFFICE VISIT (OUTPATIENT)
Dept: UROLOGY | Facility: MEDICAL CENTER | Age: 75
End: 2025-04-18
Payer: COMMERCIAL

## 2025-04-18 VITALS
DIASTOLIC BLOOD PRESSURE: 80 MMHG | HEART RATE: 82 BPM | OXYGEN SATURATION: 97 % | WEIGHT: 220 LBS | SYSTOLIC BLOOD PRESSURE: 120 MMHG | HEIGHT: 69 IN | BODY MASS INDEX: 32.58 KG/M2

## 2025-04-18 DIAGNOSIS — Z12.5 PROSTATE CANCER SCREENING: Primary | ICD-10-CM

## 2025-04-18 DIAGNOSIS — N13.8 BPH WITH OBSTRUCTION/LOWER URINARY TRACT SYMPTOMS: ICD-10-CM

## 2025-04-18 DIAGNOSIS — N52.1 ERECTILE DYSFUNCTION DUE TO DISEASES CLASSIFIED ELSEWHERE: ICD-10-CM

## 2025-04-18 DIAGNOSIS — N40.1 BPH WITH OBSTRUCTION/LOWER URINARY TRACT SYMPTOMS: ICD-10-CM

## 2025-04-18 PROCEDURE — 99213 OFFICE O/P EST LOW 20 MIN: CPT

## 2025-04-18 NOTE — PROGRESS NOTES
4/18/2025      Assessment and Plan    75 y.o. male managed by Dr. Mohr    Erectile dysfunction due to diseases classified elsewhere  Status post 3 part IPP placement performed 8/28/2024  Evaluation of the IPP was performed in the office today.  The patient was instructed on where the pump is as well as the release valve.  The device was pumped up in the office and water was cycled into the device and it did become rigid.  Additionally, the release valve was also functional with the water draining away from the device.  Patient is requesting a follow-up with MD to discuss if there are other modalities or ways to get the erection to become more firm for intercourse.    BPH with obstruction/lower urinary tract symptoms  Currently controlled on Flomax 0.4 mg daily  Patient endorses postvoid dribbling, but is unbothered by his frequency/urgency or nocturia.  We discussed potentially trialing Flomax and increased dose of 0.8 mg daily.  Patient wishes to trial this increased dosage.  Patient will trial Flomax 0.8 mg daily and will return to the office to evaluate if the increased dose provide any further benefit to his voiding pattern.    Prostate cancer screening  Patient's last PSA was performed 4/3/2025 and returned stable at a value of 1.842.  Refer to PSA trend below.  KENZIE performed in the office today.  Palpate benign prostate.  We discussed that with his PSA returning within his baseline and a reassuring KENZIE in the office today, that the patient's PSA remains overall stable.  We discussed that with no clinically significant prostate cancer found by the age of 75 that we could consider discontinuing prostate cancer screening.  However, at this time patient wishes to continue with PSA testing.  Patient will repeat PSA in 1 year with follow-up in the office at that time to undergo KENZIE.    Lab Results   Component Value Date    PSA 1.842 04/03/2025    PSA 2.528 12/04/2024    PSA 1.77 12/04/2023            History of  Present Illness  Tonio Castro is a 75 y.o. male here for evaluation of erectile dysfunction status post 3 part IPP placement performed 8/28/2024, prostate cancer screening, and BPH with obstruction/lower urinary tract symptoms.  Patient was last seen in the office on 10/10/2024 when he underwent an IPP teaching session in the office with Dr. Mohr.  Patient has a prior history of erectile dysfunction refractory to PDE 5 inhibitors.  Patient opted to proceed with placement of a penile prosthesis and underwent placement of a 3 part IPP on 8/28/2024.  Patient has also been followed for prostate cancer screening and his last PSA was performed 4/3/2025 and returned stable at a value of 1.842.  Patient's lower urinary tract symptoms are currently controlled on Flomax 0.4 mg daily.    Today, the patient reports that he is frustrated with his IPP device.  Patient notes that it is painful to pump up the device, and that it does not become as firm or rigid as he would desire for penetrative intercourse.  Patient notes that it does become filled with the water, but when not having penetrative intercourse it moves around more than he would desire.  Furthermore, the patient notes that he has difficulty finding the valve to release the fluid when he wants the erection to go down.  Patient questions if the device is supposed to become more firm.    Otherwise, the patient reports some degree of postvoid dribbling and nocturia ranging from 1-2 times a night.  However, he notes that he could live with his voiding pattern at this time if he had to.  Patient is unbothered by his frequency or urgency and denies episodes of urge incontinence.  Patient does report feelings of complete bladder emptying when he uses the restroom.  Otherwise, the patient offers no other lower urinary tract complaints today's office visit.        Review of Systems   Constitutional:  Negative for chills and fever.   HENT:  Negative for ear pain and sore  "throat.    Eyes:  Negative for pain and visual disturbance.   Respiratory:  Negative for cough and shortness of breath.    Cardiovascular:  Negative for chest pain and palpitations.   Gastrointestinal:  Negative for abdominal pain and vomiting.   Genitourinary:  Negative for decreased urine volume, difficulty urinating, dysuria, flank pain, frequency, hematuria and urgency.   Musculoskeletal:  Negative for arthralgias and back pain.   Skin:  Negative for color change and rash.   Neurological:  Negative for seizures and syncope.   All other systems reviewed and are negative.          AUA SYMPTOM SCORE      Flowsheet Row Most Recent Value   AUA SYMPTOM SCORE    How often have you had a sensation of not emptying your bladder completely after you finished urinating? 1 (P)     How often have you had to urinate again less than two hours after you finished urinating? 2 (P)     How often have you found you stopped and started again several times when you urinate? 1 (P)     How often have you found it difficult to postpone urination? 1 (P)     How often have you had a weak urinary stream? 0 (P)     How often have you had to push or strain to begin urination? 0 (P)     How many times did you most typically get up to urinate from the time you went to bed at night until the time you got up in the morning? 5 (P)     Quality of Life: If you were to spend the rest of your life with your urinary condition just the way it is now, how would you feel about that? 3 (P)     AUA SYMPTOM SCORE 10 (P)               Vitals  Vitals:    04/18/25 0928   BP: 120/80   BP Location: Left arm   Patient Position: Sitting   Cuff Size: Standard   Pulse: 82   SpO2: 97%   Weight: 99.8 kg (220 lb)   Height: 5' 9\" (1.753 m)       Physical Exam  Vitals reviewed.   Constitutional:       General: He is not in acute distress.     Appearance: Normal appearance. He is not ill-appearing.   HENT:      Head: Normocephalic and atraumatic.      Nose: Nose normal. "   Eyes:      General: No scleral icterus.  Pulmonary:      Effort: No respiratory distress.   Abdominal:      General: Abdomen is flat. There is no distension.      Palpations: Abdomen is soft.      Tenderness: There is no abdominal tenderness.   Musculoskeletal:         General: Normal range of motion.      Cervical back: Normal range of motion.   Skin:     General: Skin is warm.      Coloration: Skin is not jaundiced.   Neurological:      Mental Status: He is alert and oriented to person, place, and time.      Gait: Gait normal.   Psychiatric:         Mood and Affect: Mood normal.         Behavior: Behavior normal.           Past History  Past Medical History:   Diagnosis Date    Arthritis     BPH with obstruction/lower urinary tract symptoms     CPAP (continuous positive airway pressure) dependence     Enlarged prostate     Erectile dysfunction     Feeling of incomplete bladder emptying     GERD (gastroesophageal reflux disease)     Hyperlipidemia     Hypertension     Microscopic hematuria     Poor urinary stream     Shortness of breath     Sleep apnea     Urgency of urination      Social History     Socioeconomic History    Marital status:      Spouse name: None    Number of children: None    Years of education: None    Highest education level: None   Occupational History    None   Tobacco Use    Smoking status: Former    Smokeless tobacco: Never   Vaping Use    Vaping status: Never Used   Substance and Sexual Activity    Alcohol use: Yes     Alcohol/week: 2.0 standard drinks of alcohol     Types: 2 Cans of beer per week     Comment: rare    Drug use: No    Sexual activity: Not Currently   Other Topics Concern    None   Social History Narrative    None     Social Drivers of Health     Financial Resource Strain: Low Risk  (11/21/2024)    Received from [facility]    Overall Financial Resource Strain (CARDIA)     Difficulty of Paying Living Expenses: Not hard at all   Food Insecurity: No  Food Insecurity (11/21/2024)    Received from [facility]    Hunger Vital Sign     Worried About Running Out of Food in the Last Year: Never true     Ran Out of Food in the Last Year: Never true   Transportation Needs: No Transportation Needs (11/21/2024)    Received from [facility]    PRAPARE - Transportation     Lack of Transportation (Medical): No     Lack of Transportation (Non-Medical): No   Physical Activity: Inactive (11/17/2023)    Received from [facility]    Exercise Vital Sign     Days of Exercise per Week: 2 days     Minutes of Exercise per Session: 0 min   Stress: No Stress Concern Present (11/21/2024)    Received from [facility]    [place] of [employer]ational [employer] - Occupational Stress Questionnaire     Feeling of Stress : Not at all   Social Connections: Feeling Socially Integrated (11/21/2024)    Received from [facility]    OASIS : Social Isolation     How often do you feel lonely or isolated from those around you?: Never   Intimate Partner Violence: Not At Risk (11/21/2024)    [facility], [facility]    Humiliation, Afraid, Rape, and Kick questionnaire     Fear of Current or Ex-Partner: No     Emotionally Abused: No     Physically Abused: No     Sexually Abused: No   Housing Stability: Low Risk  (11/21/2024)    Received from [facility]    Housing Stability Vital Sign     Unable to Pay for Housing in the Last Year: No     Number of Times Moved in the Last Year: 0     Homeless in the Last Year: No     Social History     Tobacco Use   Smoking Status Former   Smokeless Tobacco Never     History reviewed. No pertinent family history.    The following portions of the patient's history were reviewed and updated as appropriate: allergies, current medications, past medical history, past social history, past surgical history and problem  list.    Results  No results found for this or any previous visit (from the past hour).]  Lab Results   Component Value Date    PSA 1.842 04/03/2025    PSA 2.528 12/04/2024    PSA 1.77 12/04/2023    PSA 2.4 11/21/2022     Lab Results   Component Value Date    CALCIUM 9.4 04/03/2025    K 4.0 04/03/2025    CO2 30 04/03/2025     04/03/2025    BUN 21 04/03/2025    CREATININE 0.82 04/03/2025     Lab Results   Component Value Date    WBC 5.66 08/20/2024    HGB 15.0 08/20/2024    HCT 43.3 08/20/2024    MCV 94 08/20/2024     (L) 08/20/2024

## 2025-04-18 NOTE — ASSESSMENT & PLAN NOTE
Currently controlled on Flomax 0.4 mg daily  Patient endorses postvoid dribbling, but is unbothered by his frequency/urgency or nocturia.  We discussed potentially trialing Flomax and increased dose of 0.8 mg daily.  Patient wishes to trial this increased dosage.  Patient will trial Flomax 0.8 mg daily and will return to the office to evaluate if the increased dose provide any further benefit to his voiding pattern.

## 2025-04-18 NOTE — ASSESSMENT & PLAN NOTE
Patient's last PSA was performed 4/3/2025 and returned stable at a value of 1.842.  Refer to PSA trend below.  KENZIE performed in the office today.  Palpate benign prostate.  We discussed that with his PSA returning within his baseline and a reassuring KENZIE in the office today, that the patient's PSA remains overall stable.  We discussed that with no clinically significant prostate cancer found by the age of 75 that we could consider discontinuing prostate cancer screening.  However, at this time patient wishes to continue with PSA testing.  Patient will repeat PSA in 1 year with follow-up in the office at that time to undergo KENZIE.    Lab Results   Component Value Date    PSA 1.842 04/03/2025    PSA 2.528 12/04/2024    PSA 1.77 12/04/2023

## 2025-04-18 NOTE — ASSESSMENT & PLAN NOTE
Status post 3 part IPP placement performed 8/28/2024  Evaluation of the IPP was performed in the office today.  The patient was instructed on where the pump is as well as the release valve.  The device was pumped up in the office and water was cycled into the device and it did become rigid.  Additionally, the release valve was also functional with the water draining away from the device.  Patient is requesting a follow-up with MD to discuss if there are other modalities or ways to get the erection to become more firm for intercourse.

## 2025-05-18 PROBLEM — Z12.5 PROSTATE CANCER SCREENING: Status: RESOLVED | Noted: 2025-04-18 | Resolved: 2025-05-18

## 2025-06-26 DIAGNOSIS — N13.8 BPH WITH OBSTRUCTION/LOWER URINARY TRACT SYMPTOMS: ICD-10-CM

## 2025-06-26 DIAGNOSIS — N40.1 BPH WITH OBSTRUCTION/LOWER URINARY TRACT SYMPTOMS: ICD-10-CM

## 2025-06-26 RX ORDER — TAMSULOSIN HYDROCHLORIDE 0.4 MG/1
0.4 CAPSULE ORAL
Qty: 90 CAPSULE | Refills: 3 | Status: SHIPPED | OUTPATIENT
Start: 2025-06-26

## 2025-07-02 ENCOUNTER — OFFICE VISIT (OUTPATIENT)
Dept: UROLOGY | Facility: MEDICAL CENTER | Age: 75
End: 2025-07-02
Payer: COMMERCIAL

## 2025-07-02 VITALS
OXYGEN SATURATION: 98 % | HEART RATE: 85 BPM | DIASTOLIC BLOOD PRESSURE: 20 MMHG | BODY MASS INDEX: 31.84 KG/M2 | WEIGHT: 215 LBS | HEIGHT: 69 IN | SYSTOLIC BLOOD PRESSURE: 118 MMHG | RESPIRATION RATE: 21 BRPM

## 2025-07-02 DIAGNOSIS — N52.03 COMBINED ARTERIAL INSUFFICIENCY AND CORPORO-VENOUS OCCLUSIVE ERECTILE DYSFUNCTION: ICD-10-CM

## 2025-07-02 DIAGNOSIS — N40.1 BPH WITH OBSTRUCTION/LOWER URINARY TRACT SYMPTOMS: Primary | ICD-10-CM

## 2025-07-02 DIAGNOSIS — Z96.0 S/P INSERTION OF PENILE IMPLANT: ICD-10-CM

## 2025-07-02 DIAGNOSIS — N13.8 BPH WITH OBSTRUCTION/LOWER URINARY TRACT SYMPTOMS: Primary | ICD-10-CM

## 2025-07-02 LAB
SL AMB  POCT GLUCOSE, UA: ABNORMAL
SL AMB LEUKOCYTE ESTERASE,UA: ABNORMAL
SL AMB POCT BILIRUBIN,UA: ABNORMAL
SL AMB POCT BLOOD,UA: ABNORMAL
SL AMB POCT CLARITY,UA: CLEAR
SL AMB POCT COLOR,UA: YELLOW
SL AMB POCT KETONES,UA: ABNORMAL
SL AMB POCT NITRITE,UA: ABNORMAL
SL AMB POCT PH,UA: 6
SL AMB POCT SPECIFIC GRAVITY,UA: <=1.005
SL AMB POCT URINE PROTEIN: ABNORMAL
SL AMB POCT UROBILINOGEN: 0.2

## 2025-07-02 PROCEDURE — 81003 URINALYSIS AUTO W/O SCOPE: CPT | Performed by: UROLOGY

## 2025-07-02 PROCEDURE — 99214 OFFICE O/P EST MOD 30 MIN: CPT | Performed by: UROLOGY

## 2025-07-02 RX ORDER — CHOLECALCIFEROL (VITAMIN D3) 25 MCG
CAPSULE ORAL DAILY
COMMUNITY

## 2025-07-02 RX ORDER — CHLORAL HYDRATE 500 MG
1000 CAPSULE ORAL DAILY
COMMUNITY

## 2025-07-02 RX ORDER — MAG HYDROX/ALUMINUM HYD/SIMETH 400-400-40
SUSPENSION, ORAL (FINAL DOSE FORM) ORAL DAILY
COMMUNITY

## 2025-07-02 NOTE — PROGRESS NOTES
Name: Tonio Castro      : 1950      MRN: [id number]  Encounter Provider: Luca Mohr MD  Encounter Date: 2025   Encounter department: [facility]OLOGY [place]  :  Assessment & Plan  S/P insertion of penile implant  Patient has trouble inflating and deflating the prosthesis.  On physical examination the implant is fully functional able to be inflated and deflated with an excellent erect and flaccid state.  The patient was made aware that the glans penis does not get erect and that he has some loose suspensory ligaments of the penis which makes the penis somewhat floppy.  The implant however is correctly placed and fully functional.  Education as to inflation and deflation was given to both wife and patient  Orders:    POCT urine dip auto non-scope    BPH with obstruction/lower urinary tract symptoms  On tamsulosin 0.4 mg p.o. daily and voiding adequately with nocturia 0-1 time per night.  Refill provided.  Patient may take a holiday from tamsulosin and if this medication does not produce a negative effect while he is off of it he can stay off of it if not he will go back on the medication  Orders:    POCT urine dip auto non-scope    PSA Total, Diagnostic; Future    Comprehensive metabolic panel; Future    Combined arterial insufficiency and corporo-venous occlusive erectile dysfunction  As above  Orders:    Comprehensive metabolic panel; Future        History of Present Illness   Tonio Castro is a 75 y.o. male who presents status post implantation of a 3 part inflatable penile prosthesis noting that he is having difficulty inflating and deflating.  The patient presents for follow-up.  PSA is within normal limits for prostate cancer screening.  The patient is voiding adequately on tamsulosin but does not feel that the tamsulosin has provided any benefit to his urination and is interested in possibly coming off that medication.  He denies gross hematuria dysuria  "incontinence    Review of Systems   Genitourinary:  Positive for frequency.   All other systems reviewed and are negative.    Pertinent Medical History             Medical History Reviewed by provider this encounter:  Tobacco  Allergies  Meds  Problems  Med Hx  Surg Hx  Fam Hx  Soc   Hx    .  Past Medical History   Past Medical History[1]  Past Surgical History[2]  Family History[3]   reports that he has quit smoking. He has never used smokeless tobacco. He reports current alcohol use of about 2.0 standard drinks of alcohol per week. He reports that he does not use drugs.  Current Outpatient Medications   Medication Instructions    amLODIPine (NORVASC) 2.5 mg, Daily    amoxicillin (AMOXIL) 500 mg capsule For DENTAL PROCEDURES    atorvastatin (LIPITOR) 20 mg, Daily    Cholecalciferol (Vitamin D-3) 25 MCG (1000 UT) CAPS Daily    fish oil 1,000 mg, Daily    Glucosamine HCl 1500 MG TABS Take by mouth    latanoprost (XALATAN) 0.005 % ophthalmic solution USE 1 DROP INTO BOTH EYES AT BEDTIME    metoprolol tartrate (LOPRESSOR) 25 mg, Daily after dinner    Multiple Vitamin (MULTI-VITAMIN DAILY) TABS Take by mouth    omeprazole (PRILOSEC) 20 mg, 2 times daily    Saw [place] 450 MG CAPS Daily    tamsulosin (FLOMAX) 0.4 mg, Oral, Daily with dinner   Allergies[4]   Medications Ordered Prior to Encounter[5]   Social History[6]     Objective   BP (!) 118/20 (BP Location: Left arm, Patient Position: Sitting, Cuff Size: Adult)   Pulse 85   Resp 21   Ht 5' 9\" (1.753 m)   Wt 97.5 kg (215 lb)   SpO2 98%   BMI 31.75 kg/m²     Physical Exam  Vitals reviewed.   Constitutional:       General: He is not in acute distress.     Appearance: Normal appearance. He is obese. He is not ill-appearing, toxic-appearing or diaphoretic.   HENT:      Head: Normocephalic and atraumatic.      Nose: Nose normal.      Mouth/Throat:      Mouth: Mucous membranes are moist.     Eyes:      Extraocular Movements: Extraocular movements intact. "     Pulmonary:      Effort: Pulmonary effort is normal. No respiratory distress.   Abdominal:      General: There is no distension.      Palpations: Abdomen is soft.   Genitourinary:     Penis: Normal.       Testes: Normal.      Prostate: Normal.      Rectum: Normal.     Musculoskeletal:         General: Normal range of motion.     Skin:     General: Skin is dry.     Neurological:      Mental Status: He is alert and oriented to person, place, and time.     Psychiatric:         Mood and Affect: Mood normal.         Behavior: Behavior normal.         Thought Content: Thought content normal.         Judgment: Judgment normal.            Results   Lab Results   Component Value Date    PSA 1.842 04/03/2025    PSA 2.528 12/04/2024    PSA 1.77 12/04/2023     Lab Results   Component Value Date    CALCIUM 9.4 04/03/2025    K 4.0 04/03/2025    CO2 30 04/03/2025     04/03/2025    BUN 21 04/03/2025    CREATININE 0.82 04/03/2025     Lab Results   Component Value Date    WBC 5.66 08/20/2024    HGB 15.0 08/20/2024    HCT 43.3 08/20/2024    MCV 94 08/20/2024     (L) 08/20/2024       Office Urine Dip  Recent Results (from the past hour)   POCT urine dip auto non-scope    Collection Time: 07/02/25 11:19 AM   Result Value Ref Range     COLOR,UA Yellow     CLARITY,UA Clear     SPECIFIC GRAVITY,UA <=1.005      PH,UA 6     LEUKOCYTE ESTERASE,UA Neg     NITRITE,UA Neg     GLUCOSE, UA Neg     KETONES,UA Neg     BILIRUBIN,UA Neg     BLOOD,UA Trace     POCT URINE PROTEIN Neg     SL AMB POCT UROBILINOGEN 0.2                 [1]   Past Medical History:  Diagnosis Date    Arthritis     BPH with obstruction/lower urinary tract symptoms     CPAP (continuous positive airway pressure) dependence     Enlarged prostate     Erectile dysfunction     Feeling of incomplete bladder emptying     GERD (gastroesophageal reflux disease)     Hyperlipidemia     Hypertension     Microscopic hematuria     Poor urinary stream     Shortness of breath      Sleep apnea     Urgency of urination    [2]   Past Surgical History:  Procedure Laterality Date    COLONOSCOPY      JOINT REPLACEMENT Bilateral     knees    AR INSJ MULTI-COMPONENT INFLATABLE PENILE PROSTH N/A 8/28/2024    Procedure: INSERTION PROSTHESIS PENILE;  Surgeon: Luca Mohr MD;  Location: [facility];  Service: Urology    AR LASER VAPORIZATION OF PROSTATE FOR URINE FLOW N/A 12/1/2022    Procedure: TRANSURETHRAL RESECTION OF PROSTATE W/ GREEN LIGHT LASER;  Surgeon: Norman Ordonez MD;  Location: [facility];  Service: Urology    AR PROSTATE NEEDLE BIOPSY ANY APPROACH N/A 10/13/2020    Procedure: TRANSPERINEAL NEEDLE BIOPSY PROSTATE (TRNBP);  Surgeon: Andrew Burden MD;  Location: [facility];  Service: Urology   [3] No family history on file.  [4] No Known Allergies  [5]   Current Outpatient Medications on File Prior to Visit   Medication Sig Dispense Refill    amLODIPine (NORVASC) 2.5 mg tablet Take 2.5 mg by mouth in the morning.      amoxicillin (AMOXIL) 500 mg capsule For DENTAL PROCEDURES      atorvastatin (LIPITOR) 20 mg tablet Take 20 mg by mouth in the morning.      Cholecalciferol (Vitamin D-3) 25 MCG (1000 UT) CAPS Take by mouth daily      Glucosamine HCl 1500 MG TABS Take by mouth      metoprolol tartrate (LOPRESSOR) 50 mg tablet Take 25 mg by mouth daily after dinner      Multiple Vitamin (MULTI-VITAMIN DAILY) TABS Take by mouth      Omega-3 Fatty Acids (fish oil) 1,000 mg Take 1,000 mg by mouth daily      omeprazole (PriLOSEC) 20 mg delayed release capsule Take 20 mg by mouth in the morning and 20 mg in the evening.      Saw [place] 450 MG CAPS Take by mouth daily      tamsulosin (FLOMAX) 0.4 mg TAKE 1 CAPSULE BY MOUTH EVERY DAY WITH DINNER 90 capsule 3    latanoprost (XALATAN) 0.005 % ophthalmic solution USE 1 DROP INTO BOTH EYES AT BEDTIME  5     No current facility-administered medications on file prior to visit.   [6]   Social History  Tobacco Use    Smoking status: Former     Smokeless tobacco: Never   Vaping Use    Vaping status: Never Used   Substance and Sexual Activity    Alcohol use: Yes     Alcohol/week: 2.0 standard drinks of alcohol     Types: 2 Cans of beer per week     Comment: rare    Drug use: No    Sexual activity: Not Currently

## 2025-07-02 NOTE — ASSESSMENT & PLAN NOTE
On tamsulosin 0.4 mg p.o. daily and voiding adequately with nocturia 0-1 time per night.  Refill provided.  Patient may take a holiday from tamsulosin and if this medication does not produce a negative effect while he is off of it he can stay off of it if not he will go back on the medication  Orders:    POCT urine dip auto non-scope    PSA Total, Diagnostic; Future    Comprehensive metabolic panel; Future

## 2025-07-02 NOTE — LETTER
2025     Monica Salinas DO  [address] [facility] [address]    Patient: Tonio Castro   YOB: 1950   Date of Visit: 2025       Dear Dr. Monica Salinas DO:    Thank you for referring Tonio Castro to me for evaluation. Below are my notes for this consultation.    If you have questions, please do not hesitate to call me. I look forward to following your patient along with you.         Sincerely,        Luca Mohr MD        CC: No Recipients    Luca Mohr MD  2025 12:23 PM  Sign when Signing Visit  Name: Tonio Castro      : 1950      MRN: [id number]  Encounter Provider: Luca Mohr MD  Encounter Date: 2025   Encounter department: [facility]OLOG[facility] [place]  :  Assessment & Plan  S/P insertion of penile implant  Patient has trouble inflating and deflating the prosthesis.  On physical examination the implant is fully functional able to be inflated and deflated with an excellent erect and flaccid state.  The patient was made aware that the glans penis does not get erect and that he has some loose suspensory ligaments of the penis which makes the penis somewhat floppy.  The implant however is correctly placed and fully functional.  Education as to inflation and deflation was given to both wife and patient  Orders:  •  POCT urine dip auto non-scope    BPH with obstruction/lower urinary tract symptoms  On tamsulosin 0.4 mg p.o. daily and voiding adequately with nocturia 0-1 time per night.  Refill provided.  Patient may take a holiday from tamsulosin and if this medication does not produce a negative effect while he is off of it he can stay off of it if not he will go back on the medication  Orders:  •  POCT urine dip auto non-scope  •  PSA Total, Diagnostic; Future  •  Comprehensive metabolic panel; Future    Combined arterial insufficiency and corporo-venous occlusive erectile dysfunction  As above  Orders:  •  Comprehensive metabolic panel;  Future        History of Present Illness  Tonio Castro is a 75 y.o. male who presents status post implantation of a 3 part inflatable penile prosthesis noting that he is having difficulty inflating and deflating.  The patient presents for follow-up.  PSA is within normal limits for prostate cancer screening.  The patient is voiding adequately on tamsulosin but does not feel that the tamsulosin has provided any benefit to his urination and is interested in possibly coming off that medication.  He denies gross hematuria dysuria incontinence    Review of Systems   Genitourinary:  Positive for frequency.   All other systems reviewed and are negative.    Pertinent Medical History            Medical History Reviewed by provider this encounter:  Tobacco  Allergies  Meds  Problems  Med Hx  Surg Hx  Fam Hx  Soc   Hx    .  Past Medical History  Past Medical History[1]  Past Surgical History[2]  Family History[3]   reports that he has quit smoking. He has never used smokeless tobacco. He reports current alcohol use of about 2.0 standard drinks of alcohol per week. He reports that he does not use drugs.  Current Outpatient Medications   Medication Instructions   • amLODIPine (NORVASC) 2.5 mg, Daily   • amoxicillin (AMOXIL) 500 mg capsule For DENTAL PROCEDURES   • atorvastatin (LIPITOR) 20 mg, Daily   • Cholecalciferol (Vitamin D-3) 25 MCG (1000 UT) CAPS Daily   • fish oil 1,000 mg, Daily   • Glucosamine HCl 1500 MG TABS Take by mouth   • latanoprost (XALATAN) 0.005 % ophthalmic solution USE 1 DROP INTO BOTH EYES AT BEDTIME   • metoprolol tartrate (LOPRESSOR) 25 mg, Daily after dinner   • Multiple Vitamin (MULTI-VITAMIN DAILY) TABS Take by mouth   • omeprazole (PRILOSEC) 20 mg, 2 times daily   • Saw [place] 450 MG CAPS Daily   • tamsulosin (FLOMAX) 0.4 mg, Oral, Daily with dinner   Allergies[4]   Medications Ordered Prior to Encounter[5]   Social History[6]     Objective  BP (!) 118/20 (BP Location: Left arm, Patient  "Position: Sitting, Cuff Size: Adult)   Pulse 85   Resp 21   Ht 5' 9\" (1.753 m)   Wt 97.5 kg (215 lb)   SpO2 98%   BMI 31.75 kg/m²     Physical Exam  Vitals reviewed.   Constitutional:       General: He is not in acute distress.     Appearance: Normal appearance. He is obese. He is not ill-appearing, toxic-appearing or diaphoretic.   HENT:      Head: Normocephalic and atraumatic.      Nose: Nose normal.      Mouth/Throat:      Mouth: Mucous membranes are moist.     Eyes:      Extraocular Movements: Extraocular movements intact.     Pulmonary:      Effort: Pulmonary effort is normal. No respiratory distress.   Abdominal:      General: There is no distension.      Palpations: Abdomen is soft.   Genitourinary:     Penis: Normal.       Testes: Normal.      Prostate: Normal.      Rectum: Normal.     Musculoskeletal:         General: Normal range of motion.     Skin:     General: Skin is dry.     Neurological:      Mental Status: He is alert and oriented to person, place, and time.     Psychiatric:         Mood and Affect: Mood normal.         Behavior: Behavior normal.         Thought Content: Thought content normal.         Judgment: Judgment normal.            Results   Lab Results   Component Value Date    PSA 1.842 04/03/2025    PSA 2.528 12/04/2024    PSA 1.77 12/04/2023     Lab Results   Component Value Date    CALCIUM 9.4 04/03/2025    K 4.0 04/03/2025    CO2 30 04/03/2025     04/03/2025    BUN 21 04/03/2025    CREATININE 0.82 04/03/2025     Lab Results   Component Value Date    WBC 5.66 08/20/2024    HGB 15.0 08/20/2024    HCT 43.3 08/20/2024    MCV 94 08/20/2024     (L) 08/20/2024       Office Urine Dip  Recent Results (from the past hour)   POCT urine dip auto non-scope    Collection Time: 07/02/25 11:19 AM   Result Value Ref Range     COLOR,UA Yellow     CLARITY,UA Clear     SPECIFIC GRAVITY,UA <=1.005      PH,UA 6     LEUKOCYTE ESTERASE,UA Neg     NITRITE,UA Neg     GLUCOSE, UA Neg     " KETONES,UA Neg     BILIRUBIN,UA Neg     BLOOD,UA Trace     POCT URINE PROTEIN Neg     SL AMB POCT UROBILINOGEN 0.2                 [1]   Past Medical History:  Diagnosis Date   • Arthritis    • BPH with obstruction/lower urinary tract symptoms    • CPAP (continuous positive airway pressure) dependence    • Enlarged prostate    • Erectile dysfunction    • Feeling of incomplete bladder emptying    • GERD (gastroesophageal reflux disease)    • Hyperlipidemia    • Hypertension    • Microscopic hematuria    • Poor urinary stream    • Shortness of breath    • Sleep apnea    • Urgency of urination    [2]   Past Surgical History:  Procedure Laterality Date   • COLONOSCOPY     • JOINT REPLACEMENT Bilateral     knees   • OR INSJ MULTI-COMPONENT INFLATABLE PENILE PROSTH N/A 8/28/2024    Procedure: INSERTION PROSTHESIS PENILE;  Surgeon: Luca Mohr MD;  Location[facility];  Service: Urology   • OR LASER VAPORIZATION OF PROSTATE FOR URINE FLOW N/A 12/1/2022    Procedure: TRANSURETHRAL RESECTION OF PROSTATE W/ GREEN LIGHT LASER;  Surgeon: Norman Ordonez MD;  Location: [facility];  Service: Urology   • OR PROSTATE NEEDLE BIOPSY ANY APPROACH N/A 10/13/2020    Procedure: TRANSPERINEAL NEEDLE BIOPSY PROSTATE (TRNBP);  Surgeon: Andrew Burden MD;  Location: [facility];  Service: Urology   [3] No family history on file.  [4] No Known Allergies  [5]   Current Outpatient Medications on File Prior to Visit   Medication Sig Dispense Refill   • amLODIPine (NORVASC) 2.5 mg tablet Take 2.5 mg by mouth in the morning.     • amoxicillin (AMOXIL) 500 mg capsule For DENTAL PROCEDURES     • atorvastatin (LIPITOR) 20 mg tablet Take 20 mg by mouth in the morning.     • Cholecalciferol (Vitamin D-3) 25 MCG (1000 UT) CAPS Take by mouth daily     • Glucosamine HCl 1500 MG TABS Take by mouth     • metoprolol tartrate (LOPRESSOR) 50 mg tablet Take 25 mg by mouth daily after dinner     • Multiple Vitamin (MULTI-VITAMIN DAILY) TABS Take by  mouth     • Omega-3 Fatty Acids (fish oil) 1,000 mg Take 1,000 mg by mouth daily     • omeprazole (PriLOSEC) 20 mg delayed release capsule Take 20 mg by mouth in the morning and 20 mg in the evening.     • Saw [place] 450 MG CAPS Take by mouth daily     • tamsulosin (FLOMAX) 0.4 mg TAKE 1 CAPSULE BY MOUTH EVERY DAY WITH DINNER 90 capsule 3   • latanoprost (XALATAN) 0.005 % ophthalmic solution USE 1 DROP INTO BOTH EYES AT BEDTIME  5     No current facility-administered medications on file prior to visit.   [6]   Social History  Tobacco Use   • Smoking status: Former   • Smokeless tobacco: Never   Vaping Use   • Vaping status: Never Used   Substance and Sexual Activity   • Alcohol use: Yes     Alcohol/week: 2.0 standard drinks of alcohol     Types: 2 Cans of beer per week     Comment: rare   • Drug use: No   • Sexual activity: Not Currently

## (undated) DEVICE — SYRINGE 50ML LL

## (undated) DEVICE — 3M™ STERI-DRAPE™ U-DRAPE 1015: Brand: STERI-DRAPE™

## (undated) DEVICE — SUT VICRYL 2-0 SH 27 IN UNDYED J417H

## (undated) DEVICE — STAYS ELASTIC 5MM SHARP HOOK LONE STAR

## (undated) DEVICE — DECANTER: Brand: UNBRANDED

## (undated) DEVICE — MEDI-VAC YANKAUER SUCTION HANDLE W/BULBOUS AND CONTROL VENT: Brand: CARDINAL HEALTH

## (undated) DEVICE — 3M™ STERI-STRIP™ COMPOUND BENZOIN TINCTURE 40 BAGS/CARTON 4 CARTONS/CASE C1544: Brand: 3M™ STERI-STRIP™

## (undated) DEVICE — RAZOR STERILE

## (undated) DEVICE — IV SET 15 DROP 3/Y GRAVITY CARESITE

## (undated) DEVICE — BAG URINE DRAINAGE 2000ML ANTI RFLX LF

## (undated) DEVICE — BETHLEHEM UNIVERSAL MINOR GEN: Brand: CARDINAL HEALTH

## (undated) DEVICE — ELECTRODE BLADE MOD E-Z CLEAN  2.75IN 7CM -0012AM

## (undated) DEVICE — STRETCH BANDAGE: Brand: CURITY

## (undated) DEVICE — CATH FOLEY 16FR 5ML 2WAY LUBRICATH

## (undated) DEVICE — PACK TUR

## (undated) DEVICE — SCD SEQUENTIAL COMPRESSION COMFORT SLEEVE MEDIUM KNEE LENGTH: Brand: KENDALL SCD

## (undated) DEVICE — Device

## (undated) DEVICE — GLOVE SRG BIOGEL 8

## (undated) DEVICE — UROCATCH BAG

## (undated) DEVICE — WET SKIN PREP TRAY: Brand: MEDLINE INDUSTRIES, INC.

## (undated) DEVICE — SYRINGE 10ML LL

## (undated) DEVICE — INVIEW CLEAR LEGGINGS: Brand: CONVERTORS

## (undated) DEVICE — GAUZE SPONGES,16 PLY: Brand: CURITY

## (undated) DEVICE — GLOVE SRG BIOGEL 7.5

## (undated) DEVICE — SINGLE PORT MANIFOLD: Brand: NEPTUNE 2

## (undated) DEVICE — LASER FIBER: Brand: MOXY

## (undated) DEVICE — JP CHAN DRN SIL HUBLESS 15FR W/TRO: Brand: CARDINAL HEALTH

## (undated) DEVICE — GLOVE INDICATOR PI UNDERGLOVE SZ 8 BLUE

## (undated) DEVICE — CHLORAPREP HI-LITE 26ML ORANGE

## (undated) DEVICE — SPONGE LAP 18 X 18 IN STRL RFD

## (undated) DEVICE — HOOK ELASTIC STAY 12MM BLUNT DUAL LEAD

## (undated) DEVICE — SPONGE SCRUB 4 PCT CHLORHEXIDINE

## (undated) DEVICE — SUT ETHILON 3-0 PS-1 18 IN 1663G

## (undated) DEVICE — SYRINGE BULB 2 OZ

## (undated) DEVICE — TUBING SUCTION 5MM X 12 FT

## (undated) DEVICE — PREP PAD BNS: Brand: CONVERTORS

## (undated) DEVICE — ELECTRODE BLADE MOD  E-Z CLEAN 6.5IN -0014M

## (undated) DEVICE — PLUMEPEN PRO 10FT

## (undated) DEVICE — COVER ROLL 8 IN X 2 YD STRETCH TAPE

## (undated) DEVICE — 2000CC GUARDIAN II: Brand: GUARDIAN

## (undated) DEVICE — JACKSON-PRATT 100CC BULB RESERVOIR: Brand: CARDINAL HEALTH

## (undated) DEVICE — 3M™ DURAPORE™ SURGICAL TAPE 1538-3, 3 INCH X 10 YARD (7,5CM X 9,1M), 4 ROLLS/BOX: Brand: 3M™ DURAPORE™

## (undated) DEVICE — GLOVE INDICATOR PI UNDERGLOVE SZ 7 BLUE

## (undated) DEVICE — SUT MONOCRYL 4-0 PS-2 27 IN Y426H

## (undated) DEVICE — INTENDED FOR TISSUE SEPARATION, AND OTHER PROCEDURES THAT REQUIRE A SHARP SURGICAL BLADE TO PUNCTURE OR CUT.: Brand: BARD-PARKER SAFETY BLADES SIZE 15, STERILE

## (undated) DEVICE — DRAPE SHEET THREE QUARTER

## (undated) DEVICE — STERILE SURGICAL LUBRICANT,  TUBE: Brand: SURGILUBE